# Patient Record
Sex: MALE | Race: OTHER | HISPANIC OR LATINO | URBAN - METROPOLITAN AREA
[De-identification: names, ages, dates, MRNs, and addresses within clinical notes are randomized per-mention and may not be internally consistent; named-entity substitution may affect disease eponyms.]

---

## 2017-01-03 ENCOUNTER — EMERGENCY (EMERGENCY)
Facility: HOSPITAL | Age: 82
LOS: 1 days | Discharge: HOME | End: 2017-01-03
Attending: EMERGENCY MEDICINE
Payer: MEDICARE

## 2017-01-03 VITALS
OXYGEN SATURATION: 100 % | TEMPERATURE: 99 F | DIASTOLIC BLOOD PRESSURE: 76 MMHG | WEIGHT: 177.91 LBS | RESPIRATION RATE: 18 BRPM | HEART RATE: 70 BPM | HEIGHT: 64 IN | SYSTOLIC BLOOD PRESSURE: 152 MMHG

## 2017-01-03 VITALS
HEART RATE: 70 BPM | SYSTOLIC BLOOD PRESSURE: 138 MMHG | OXYGEN SATURATION: 96 % | DIASTOLIC BLOOD PRESSURE: 67 MMHG | RESPIRATION RATE: 18 BRPM

## 2017-01-03 LAB
ALBUMIN SERPL ELPH-MCNC: 3.5 G/DL — SIGNIFICANT CHANGE UP (ref 3.3–5.2)
ALP SERPL-CCNC: 141 U/L — HIGH (ref 40–120)
ALT FLD-CCNC: 62 U/L — HIGH
ANION GAP SERPL CALC-SCNC: 11 MMOL/L — SIGNIFICANT CHANGE UP (ref 5–17)
APTT BLD: 22.4 SEC — LOW (ref 27.5–37.4)
AST SERPL-CCNC: 49 U/L — HIGH
BASOPHILS # BLD AUTO: 0 K/UL — SIGNIFICANT CHANGE UP (ref 0–0.2)
BASOPHILS NFR BLD AUTO: 0.1 % — SIGNIFICANT CHANGE UP (ref 0–2)
BILIRUB SERPL-MCNC: 0.5 MG/DL — SIGNIFICANT CHANGE UP (ref 0.4–2)
BUN SERPL-MCNC: 30 MG/DL — HIGH (ref 8–20)
CALCIUM SERPL-MCNC: 9.6 MG/DL — SIGNIFICANT CHANGE UP (ref 8.6–10.2)
CHLORIDE SERPL-SCNC: 93 MMOL/L — LOW (ref 98–107)
CK SERPL-CCNC: 53 U/L — SIGNIFICANT CHANGE UP (ref 30–200)
CO2 SERPL-SCNC: 31 MMOL/L — HIGH (ref 22–29)
CREAT SERPL-MCNC: 0.66 MG/DL — SIGNIFICANT CHANGE UP (ref 0.5–1.3)
EOSINOPHIL # BLD AUTO: 0 K/UL — SIGNIFICANT CHANGE UP (ref 0–0.5)
EOSINOPHIL NFR BLD AUTO: 0.5 % — SIGNIFICANT CHANGE UP (ref 0–5)
GLUCOSE SERPL-MCNC: 151 MG/DL — HIGH (ref 70–115)
HCT VFR BLD CALC: 37.6 % — LOW (ref 42–52)
HGB BLD-MCNC: 12 G/DL — LOW (ref 14–18)
INR BLD: 0.99 RATIO — SIGNIFICANT CHANGE UP (ref 0.88–1.16)
LYMPHOCYTES # BLD AUTO: 1.6 K/UL — SIGNIFICANT CHANGE UP (ref 1–4.8)
LYMPHOCYTES # BLD AUTO: 14.5 % — LOW (ref 20–55)
MCHC RBC-ENTMCNC: 31.9 G/DL — LOW (ref 32–36)
MCHC RBC-ENTMCNC: 32.8 PG — HIGH (ref 27–31)
MCV RBC AUTO: 102.7 FL — HIGH (ref 80–94)
MONOCYTES # BLD AUTO: 0.6 K/UL — SIGNIFICANT CHANGE UP (ref 0–0.8)
MONOCYTES NFR BLD AUTO: 5.8 % — SIGNIFICANT CHANGE UP (ref 3–10)
NEUTROPHILS # BLD AUTO: 8.6 K/UL — HIGH (ref 1.8–8)
NEUTROPHILS NFR BLD AUTO: 78.4 % — HIGH (ref 37–73)
NT-PROBNP SERPL-SCNC: 205 PG/ML — SIGNIFICANT CHANGE UP (ref 0–300)
PLATELET # BLD AUTO: 172 K/UL — SIGNIFICANT CHANGE UP (ref 150–400)
POTASSIUM SERPL-MCNC: 5.2 MMOL/L — SIGNIFICANT CHANGE UP (ref 3.5–5.3)
POTASSIUM SERPL-SCNC: 5.2 MMOL/L — SIGNIFICANT CHANGE UP (ref 3.5–5.3)
PROT SERPL-MCNC: 6.2 G/DL — LOW (ref 6.6–8.7)
PROTHROM AB SERPL-ACNC: 10.9 SEC — SIGNIFICANT CHANGE UP (ref 10–13.1)
RBC # BLD: 3.66 M/UL — LOW (ref 4.6–6.2)
RBC # FLD: 15.2 % — SIGNIFICANT CHANGE UP (ref 11–15.6)
SODIUM SERPL-SCNC: 135 MMOL/L — SIGNIFICANT CHANGE UP (ref 135–145)
TROPONIN T SERPL-MCNC: 0.02 NG/ML — SIGNIFICANT CHANGE UP (ref 0–0.06)
TROPONIN T SERPL-MCNC: 0.03 NG/ML — SIGNIFICANT CHANGE UP (ref 0–0.06)
WBC # BLD: 11 K/UL — HIGH (ref 4.8–10.8)
WBC # FLD AUTO: 11 K/UL — HIGH (ref 4.8–10.8)

## 2017-01-03 PROCEDURE — 85610 PROTHROMBIN TIME: CPT

## 2017-01-03 PROCEDURE — T1013: CPT

## 2017-01-03 PROCEDURE — 85730 THROMBOPLASTIN TIME PARTIAL: CPT

## 2017-01-03 PROCEDURE — 84484 ASSAY OF TROPONIN QUANT: CPT

## 2017-01-03 PROCEDURE — 80053 COMPREHEN METABOLIC PANEL: CPT

## 2017-01-03 PROCEDURE — 99284 EMERGENCY DEPT VISIT MOD MDM: CPT

## 2017-01-03 PROCEDURE — 85027 COMPLETE CBC AUTOMATED: CPT

## 2017-01-03 PROCEDURE — 82550 ASSAY OF CK (CPK): CPT

## 2017-01-03 PROCEDURE — 99283 EMERGENCY DEPT VISIT LOW MDM: CPT

## 2017-01-03 PROCEDURE — 83880 ASSAY OF NATRIURETIC PEPTIDE: CPT

## 2017-01-03 NOTE — ED PROVIDER NOTE - CHPI ED SYMPTOMS NEG
no left arm pain/no shortness of breath/no weakness/no dizziness/no syncope/no diaphoresis/no jaw pain/no back pain/no decreased eating/drinking/no fever/no vomiting/no cough/no palpitations/no nausea/no chills/no loss of consciousness

## 2017-01-03 NOTE — ED ADULT NURSE NOTE - OBJECTIVE STATEMENT
received pt AOx3, c/o left sided chest pain that started today. pt has stg 4 lung ca and is O2 dependent at home (4-5L). pt currently 99% on 4L. resting comfortably, in no distress. Respirations even unlabored, MAEx4. Neuro intact. non radiating pain

## 2017-01-03 NOTE — ED ADULT TRIAGE NOTE - CHIEF COMPLAINT QUOTE
pt bib ems c/o chest pain and SOB x2 days. pt on hospice for Lung CA  1 nitro and Aspirin 162mg given by EMS  normally on O2 4L-5L NC at home

## 2017-01-03 NOTE — ED ADULT NURSE NOTE - PMH
BPH (benign prostatic hyperplasia)    Depression    HTN (hypertension)    Hypercholesteremia    Osteoarthritis    Pulmonary fibrosis

## 2017-01-03 NOTE — ED PROVIDER NOTE - MEDICAL DECISION MAKING DETAILS
C/O CHEST PAIN. AWAITING EKG/LABS. COMFORTABLE IN ER C/O CHEST PAIN. AWAITING EKG/LABS. COMFORTABLE IN ER  WORK UP NEGATIVE FOR ACUTE CARDIAC PATHOLOGY, D/C

## 2017-01-03 NOTE — ED ADULT NURSE REASSESSMENT NOTE - NS ED NURSE REASSESS COMMENT FT1
pt resting comfortably, states he is feeling better. Respirations even unlabored. pending 2nd trop. Pt aware of plan of care.

## 2017-02-22 ENCOUNTER — APPOINTMENT (OUTPATIENT)
Dept: PULMONOLOGY | Facility: CLINIC | Age: 82
End: 2017-02-22

## 2017-04-06 ENCOUNTER — RX RENEWAL (OUTPATIENT)
Age: 82
End: 2017-04-06

## 2017-12-12 ENCOUNTER — INPATIENT (INPATIENT)
Facility: HOSPITAL | Age: 82
LOS: 4 days | Discharge: HOSPICE | DRG: 196 | End: 2017-12-17
Attending: HOSPITALIST | Admitting: STUDENT IN AN ORGANIZED HEALTH CARE EDUCATION/TRAINING PROGRAM
Payer: MEDICARE

## 2017-12-12 VITALS
OXYGEN SATURATION: 88 % | WEIGHT: 190.04 LBS | HEIGHT: 66 IN | RESPIRATION RATE: 32 BRPM | HEART RATE: 102 BPM | SYSTOLIC BLOOD PRESSURE: 99 MMHG | DIASTOLIC BLOOD PRESSURE: 49 MMHG | TEMPERATURE: 98 F

## 2017-12-12 DIAGNOSIS — J84.10 PULMONARY FIBROSIS, UNSPECIFIED: ICD-10-CM

## 2017-12-12 DIAGNOSIS — F32.9 MAJOR DEPRESSIVE DISORDER, SINGLE EPISODE, UNSPECIFIED: ICD-10-CM

## 2017-12-12 DIAGNOSIS — N40.0 BENIGN PROSTATIC HYPERPLASIA WITHOUT LOWER URINARY TRACT SYMPTOMS: ICD-10-CM

## 2017-12-12 DIAGNOSIS — Z29.9 ENCOUNTER FOR PROPHYLACTIC MEASURES, UNSPECIFIED: ICD-10-CM

## 2017-12-12 DIAGNOSIS — R53.81 OTHER MALAISE: ICD-10-CM

## 2017-12-12 DIAGNOSIS — Z51.5 ENCOUNTER FOR PALLIATIVE CARE: ICD-10-CM

## 2017-12-12 DIAGNOSIS — E78.00 PURE HYPERCHOLESTEROLEMIA, UNSPECIFIED: ICD-10-CM

## 2017-12-12 DIAGNOSIS — J96.21 ACUTE AND CHRONIC RESPIRATORY FAILURE WITH HYPOXIA: ICD-10-CM

## 2017-12-12 DIAGNOSIS — R09.02 HYPOXEMIA: ICD-10-CM

## 2017-12-12 DIAGNOSIS — I10 ESSENTIAL (PRIMARY) HYPERTENSION: ICD-10-CM

## 2017-12-12 LAB
ALBUMIN SERPL ELPH-MCNC: 3.5 G/DL — SIGNIFICANT CHANGE UP (ref 3.3–5.2)
ALP SERPL-CCNC: 103 U/L — SIGNIFICANT CHANGE UP (ref 40–120)
ALT FLD-CCNC: 33 U/L — SIGNIFICANT CHANGE UP
ANION GAP SERPL CALC-SCNC: 18 MMOL/L — HIGH (ref 5–17)
AST SERPL-CCNC: 73 U/L — HIGH
BASOPHILS # BLD AUTO: 0 K/UL — SIGNIFICANT CHANGE UP (ref 0–0.2)
BASOPHILS NFR BLD AUTO: 0.2 % — SIGNIFICANT CHANGE UP (ref 0–2)
BILIRUB SERPL-MCNC: 0.4 MG/DL — SIGNIFICANT CHANGE UP (ref 0.4–2)
BUN SERPL-MCNC: 21 MG/DL — HIGH (ref 8–20)
CALCIUM SERPL-MCNC: 9.2 MG/DL — SIGNIFICANT CHANGE UP (ref 8.6–10.2)
CHLORIDE SERPL-SCNC: 96 MMOL/L — LOW (ref 98–107)
CK SERPL-CCNC: 33 U/L — SIGNIFICANT CHANGE UP (ref 30–200)
CO2 SERPL-SCNC: 23 MMOL/L — SIGNIFICANT CHANGE UP (ref 22–29)
CREAT SERPL-MCNC: 1.15 MG/DL — SIGNIFICANT CHANGE UP (ref 0.5–1.3)
EOSINOPHIL # BLD AUTO: 0.4 K/UL — SIGNIFICANT CHANGE UP (ref 0–0.5)
EOSINOPHIL NFR BLD AUTO: 3.2 % — SIGNIFICANT CHANGE UP (ref 0–5)
ERYTHROCYTE [SEDIMENTATION RATE] IN BLOOD: 49 MM/HR — HIGH (ref 0–20)
GAS PNL BLDA: SIGNIFICANT CHANGE UP
GLUCOSE SERPL-MCNC: 220 MG/DL — HIGH (ref 70–115)
HCT VFR BLD CALC: 43.7 % — SIGNIFICANT CHANGE UP (ref 42–52)
HGB BLD-MCNC: 13.8 G/DL — LOW (ref 14–18)
LACTATE SERPL-SCNC: 0.8 MMOL/L — SIGNIFICANT CHANGE UP (ref 0.5–2)
LDH SERPL L TO P-CCNC: 209 U/L — HIGH (ref 98–192)
LIDOCAIN IGE QN: 38 U/L — SIGNIFICANT CHANGE UP (ref 22–51)
LYMPHOCYTES # BLD AUTO: 2.8 K/UL — SIGNIFICANT CHANGE UP (ref 1–4.8)
LYMPHOCYTES # BLD AUTO: 21.4 % — SIGNIFICANT CHANGE UP (ref 20–55)
MCHC RBC-ENTMCNC: 30.2 PG — SIGNIFICANT CHANGE UP (ref 27–31)
MCHC RBC-ENTMCNC: 31.6 G/DL — LOW (ref 32–36)
MCV RBC AUTO: 95.6 FL — HIGH (ref 80–94)
MONOCYTES # BLD AUTO: 0.6 K/UL — SIGNIFICANT CHANGE UP (ref 0–0.8)
MONOCYTES NFR BLD AUTO: 4.6 % — SIGNIFICANT CHANGE UP (ref 3–10)
NEUTROPHILS # BLD AUTO: 8.8 K/UL — HIGH (ref 1.8–8)
NEUTROPHILS NFR BLD AUTO: 67.8 % — SIGNIFICANT CHANGE UP (ref 37–73)
NT-PROBNP SERPL-SCNC: 107 PG/ML — SIGNIFICANT CHANGE UP (ref 0–300)
PLATELET # BLD AUTO: 216 K/UL — SIGNIFICANT CHANGE UP (ref 150–400)
POTASSIUM SERPL-MCNC: 4.4 MMOL/L — SIGNIFICANT CHANGE UP (ref 3.5–5.3)
POTASSIUM SERPL-SCNC: 4.4 MMOL/L — SIGNIFICANT CHANGE UP (ref 3.5–5.3)
PROT SERPL-MCNC: 7.8 G/DL — SIGNIFICANT CHANGE UP (ref 6.6–8.7)
RBC # BLD: 4.57 M/UL — LOW (ref 4.6–6.2)
RBC # FLD: 15.4 % — SIGNIFICANT CHANGE UP (ref 11–15.6)
SODIUM SERPL-SCNC: 137 MMOL/L — SIGNIFICANT CHANGE UP (ref 135–145)
TROPONIN T SERPL-MCNC: 0.06 NG/ML — SIGNIFICANT CHANGE UP (ref 0–0.06)
WBC # BLD: 13 K/UL — HIGH (ref 4.8–10.8)
WBC # FLD AUTO: 13 K/UL — HIGH (ref 4.8–10.8)

## 2017-12-12 PROCEDURE — 99223 1ST HOSP IP/OBS HIGH 75: CPT

## 2017-12-12 PROCEDURE — 71010: CPT | Mod: 26

## 2017-12-12 PROCEDURE — 99497 ADVNCD CARE PLAN 30 MIN: CPT | Mod: 25

## 2017-12-12 PROCEDURE — 99291 CRITICAL CARE FIRST HOUR: CPT

## 2017-12-12 PROCEDURE — 99053 MED SERV 10PM-8AM 24 HR FAC: CPT

## 2017-12-12 PROCEDURE — 71275 CT ANGIOGRAPHY CHEST: CPT | Mod: 26

## 2017-12-12 PROCEDURE — 93010 ELECTROCARDIOGRAM REPORT: CPT

## 2017-12-12 RX ORDER — SERTRALINE 25 MG/1
50 TABLET, FILM COATED ORAL DAILY
Qty: 0 | Refills: 0 | Status: DISCONTINUED | OUTPATIENT
Start: 2017-12-12 | End: 2017-12-12

## 2017-12-12 RX ORDER — MORPHINE SULFATE 50 MG/1
2 CAPSULE, EXTENDED RELEASE ORAL EVERY 6 HOURS
Qty: 0 | Refills: 0 | Status: DISCONTINUED | OUTPATIENT
Start: 2017-12-12 | End: 2017-12-15

## 2017-12-12 RX ORDER — PANTOPRAZOLE SODIUM 20 MG/1
40 TABLET, DELAYED RELEASE ORAL
Qty: 0 | Refills: 0 | Status: DISCONTINUED | OUTPATIENT
Start: 2017-12-12 | End: 2017-12-17

## 2017-12-12 RX ORDER — TAMSULOSIN HYDROCHLORIDE 0.4 MG/1
0.4 CAPSULE ORAL AT BEDTIME
Qty: 0 | Refills: 0 | Status: DISCONTINUED | OUTPATIENT
Start: 2017-12-12 | End: 2017-12-12

## 2017-12-12 RX ORDER — SIMVASTATIN 20 MG/1
40 TABLET, FILM COATED ORAL AT BEDTIME
Qty: 0 | Refills: 0 | Status: DISCONTINUED | OUTPATIENT
Start: 2017-12-12 | End: 2017-12-17

## 2017-12-12 RX ORDER — SENNA PLUS 8.6 MG/1
2 TABLET ORAL AT BEDTIME
Qty: 0 | Refills: 0 | Status: DISCONTINUED | OUTPATIENT
Start: 2017-12-12 | End: 2017-12-17

## 2017-12-12 RX ORDER — IPRATROPIUM/ALBUTEROL SULFATE 18-103MCG
3 AEROSOL WITH ADAPTER (GRAM) INHALATION EVERY 6 HOURS
Qty: 0 | Refills: 0 | Status: DISCONTINUED | OUTPATIENT
Start: 2017-12-12 | End: 2017-12-12

## 2017-12-12 RX ORDER — SODIUM CHLORIDE 9 MG/ML
3 INJECTION INTRAMUSCULAR; INTRAVENOUS; SUBCUTANEOUS ONCE
Qty: 0 | Refills: 0 | Status: COMPLETED | OUTPATIENT
Start: 2017-12-12 | End: 2017-12-12

## 2017-12-12 RX ORDER — SIMVASTATIN 20 MG/1
40 TABLET, FILM COATED ORAL AT BEDTIME
Qty: 0 | Refills: 0 | Status: DISCONTINUED | OUTPATIENT
Start: 2017-12-12 | End: 2017-12-12

## 2017-12-12 RX ORDER — IPRATROPIUM/ALBUTEROL SULFATE 18-103MCG
3 AEROSOL WITH ADAPTER (GRAM) INHALATION EVERY 6 HOURS
Qty: 0 | Refills: 0 | Status: COMPLETED | OUTPATIENT
Start: 2017-12-12 | End: 2017-12-17

## 2017-12-12 RX ORDER — MORPHINE SULFATE 50 MG/1
4 CAPSULE, EXTENDED RELEASE ORAL
Qty: 0 | Refills: 0 | Status: DISCONTINUED | OUTPATIENT
Start: 2017-12-12 | End: 2017-12-17

## 2017-12-12 RX ORDER — SERTRALINE 25 MG/1
50 TABLET, FILM COATED ORAL DAILY
Qty: 0 | Refills: 0 | Status: DISCONTINUED | OUTPATIENT
Start: 2017-12-12 | End: 2017-12-17

## 2017-12-12 RX ORDER — ASPIRIN/CALCIUM CARB/MAGNESIUM 324 MG
81 TABLET ORAL DAILY
Qty: 0 | Refills: 0 | Status: DISCONTINUED | OUTPATIENT
Start: 2017-12-12 | End: 2017-12-12

## 2017-12-12 RX ORDER — SODIUM CHLORIDE 9 MG/ML
250 INJECTION INTRAMUSCULAR; INTRAVENOUS; SUBCUTANEOUS ONCE
Qty: 0 | Refills: 0 | Status: COMPLETED | OUTPATIENT
Start: 2017-12-12 | End: 2017-12-12

## 2017-12-12 RX ORDER — MORPHINE SULFATE 50 MG/1
15 CAPSULE, EXTENDED RELEASE ORAL EVERY 8 HOURS
Qty: 0 | Refills: 0 | Status: DISCONTINUED | OUTPATIENT
Start: 2017-12-12 | End: 2017-12-17

## 2017-12-12 RX ORDER — TAMSULOSIN HYDROCHLORIDE 0.4 MG/1
0.4 CAPSULE ORAL AT BEDTIME
Qty: 0 | Refills: 0 | Status: DISCONTINUED | OUTPATIENT
Start: 2017-12-12 | End: 2017-12-17

## 2017-12-12 RX ORDER — PANTOPRAZOLE SODIUM 20 MG/1
40 TABLET, DELAYED RELEASE ORAL
Qty: 0 | Refills: 0 | Status: DISCONTINUED | OUTPATIENT
Start: 2017-12-12 | End: 2017-12-12

## 2017-12-12 RX ORDER — ASPIRIN/CALCIUM CARB/MAGNESIUM 324 MG
81 TABLET ORAL DAILY
Qty: 0 | Refills: 0 | Status: DISCONTINUED | OUTPATIENT
Start: 2017-12-12 | End: 2017-12-17

## 2017-12-12 RX ORDER — SENNA PLUS 8.6 MG/1
2 TABLET ORAL AT BEDTIME
Qty: 0 | Refills: 0 | Status: DISCONTINUED | OUTPATIENT
Start: 2017-12-12 | End: 2017-12-12

## 2017-12-12 RX ADMIN — SODIUM CHLORIDE 3 MILLILITER(S): 9 INJECTION INTRAMUSCULAR; INTRAVENOUS; SUBCUTANEOUS at 03:03

## 2017-12-12 RX ADMIN — Medication 3 MILLILITER(S): at 14:19

## 2017-12-12 RX ADMIN — SODIUM CHLORIDE 500 MILLILITER(S): 9 INJECTION INTRAMUSCULAR; INTRAVENOUS; SUBCUTANEOUS at 03:03

## 2017-12-12 NOTE — H&P ADULT - NEUROLOGICAL DETAILS
responds to verbal commands/alert and oriented x 3/responds to pain
responds to pain/alert and oriented x 3/responds to verbal commands

## 2017-12-12 NOTE — ED ADULT NURSE REASSESSMENT NOTE - NS ED NURSE REASSESS COMMENT FT1
Pt. started on BiPAP, unable to maintain saturation level above 90% on Venti mask, BP dropped to 80s systolic, 250 bolus initiated. pt responding well to fluids

## 2017-12-12 NOTE — CONSULT NOTE ADULT - SUBJECTIVE AND OBJECTIVE BOX
Patient is a 86y old  Male who presents with a chief complaint of SOB  PAST MEDICAL & SURGICAL HISTORY:  Depression  BPH (benign prostatic hyperplasia)  Osteoarthritis  Pulmonary fibrosis  HTN (hypertension)  Hypercholesteremia  No significant past surgical history    CHRISTINA GAL   86y    Male    Review of Systems:  + SOB   + CP              All other ROS are negative.    ICU Vital Signs Last 24 Hrs  T(C): 36.9 (12 Dec 2017 02:25), Max: 36.9 (12 Dec 2017 02:25)  T(F): 98.4 (12 Dec 2017 02:25), Max: 98.4 (12 Dec 2017 02:25)  HR: 84 (12 Dec 2017 03:40) (80 - 102)  BP: 104/56 (12 Dec 2017 03:40) (99/49 - 104/56)  BP(mean): --  ABP: --  ABP(mean): --  RR: 21 (12 Dec 2017 03:40) (21 - 32)  SpO2: 99% (12 Dec 2017 03:40) (88% - 99%)    Physical Examination:    General:  No distress on BIPAP.      HEENT: + JVD    PULM: bilateral BS crackles bilat     CVS: s1 s2 reg    ABD:  soft NT    EXT: + edema     SKIN:  warm    Neuro: alert awake  moves 4 ext    ABG - ( 12 Dec 2017 03:00 )  pH: 7.34  /  pCO2: 43    /  pO2: 71    / HCO3: 22    / Base Excess: -2.8  /  SaO2: 93          LABS:                        13.8   13.0  )-----------( 216      ( 12 Dec 2017 02:46 )             43.7     12-12    137  |  96<L>  |  21.0<H>  ----------------------------<  220<H>  4.4   |  23.0  |  1.15    Ca    9.2      12 Dec 2017 02:46    TPro  7.8  /  Alb  3.5  /  TBili  0.4  /  DBili  x   /  AST  73<H>  /  ALT  33  /  AlkPhos  103  12-12      CARDIAC MARKERS ( 12 Dec 2017 02:46 )  x     / 0.06 ng/mL / 33 U/L / x     / x          Medications    RADIOLOGY/IMAGING/ECHO  < from: CT Angio Chest w/ IV Cont (12.12.17 @ 05:02) >  IMPRESSION:    Negative for pulmonary emboli.    Chronic interstitial lung disease, likely IPF. Overall no significant   interval change from prior exam.      < end of copied text >      Assessment/Plan:    85M hx HTN HLD depression BPH ex smoker with  crippling pulmonary fibrosis on 24/7 face mask 02.  Admit with SOB.  Hypoxemic in the ED  placed on BIPAP and sent for CTA to r/o pE which is negative.     Information provided to me by the grandson.  The patient lives with his daughter who is now visiting other family out of town.      Christina has been essentially confined to a chair for the better part of the last 3 months with a decline in his quality of life.  He becomes extremely SOB with tasks as simple as trying to stand up.  He can no longer walk to the bathroom without severe resp distress.        I spoke to grandson in terms of ventilator and likelihood of no benefit, and likely  increased burden     Palliative care consult placed.  The Grandson Thor Perez [phone] would like to be available to help his Mom (pt's daughter) with difficult decisions.      Patient is comfortable  on BIPAP  His ABG is acceptable  He is HD stable.    Lactate elevation may be due to resp distress,  and a result of his hypoxemia, repeat.      ?? if there is any acute component here that has worsened his underlying disease of is this just progression.      Would culture add zithromax nothing more at this point.  Send RVP.      Ask [facility] to see as per hospital policy.  Perhaps they can help with prognosis to assist family/patient with decisions.

## 2017-12-12 NOTE — ED ADULT NURSE REASSESSMENT NOTE - NS ED NURSE REASSESS COMMENT FT1
patient received alert and oriented x3, on Bi-PAP for difficulty breathing, patient alert and oriented x3, denies any discomfort at this time. on cardiac monitor. NSR, VSS, awaiting bed in the Stepdown unit. will continue to monitor.

## 2017-12-12 NOTE — H&P ADULT - ASSESSMENT
87 yo M with h/o idiopathic pulmonary fibrosis here with IPF exacerbation and worsening decline over the past year.
87 yo M with h/o idiopathic pulmonary fibrosis here with IPF exacerbation and worsening decline over the past year.

## 2017-12-12 NOTE — ED ADULT NURSE NOTE - OBJECTIVE STATEMENT
Pt. BIBA for respiratory distress. Pt. has Hx of Lung CA end stage, as per family pt. is not receiving any treatments, has home health nurse once a week, diagnosed in July.

## 2017-12-12 NOTE — ED PROVIDER NOTE - PROGRESS NOTE DETAILS
60 % ventilation not working for pt, will place pt on BiPAP, order diuretics will re-evaluate. On bipap, patient is stating 96%. ABG is as noted. CXR is noted and appears similar to previous cxr. Will obtain ICU consultation and CT to r/o acute PE for PNA in setting of lung CA with mets. Patient stable. ICU consult obtained. Patient still requires bipap. Will admit to step down for management.

## 2017-12-12 NOTE — ED ADULT NURSE REASSESSMENT NOTE - NS ED NURSE REASSESS COMMENT FT1
pt care assumed at 1950, no apparent distress noted at this time, charting as noted. Pt received Alert and Oriented to person, place, situation and time laying in bed comfortably at this time. Pt c/o hunger, but denies difficulty breathing. Pt is maintaining 96% on high flow NC. pt was placed on bedpan and cleaned at this time. Pt remains Normal Sinus Rhythm on cardiac monitor. VSS. plan fo care explained, will continue to monitor. pt care assumed at 1950, no apparent distress noted at this time, charting as noted. Pt received Alert and Oriented to person, place, situation and time laying in bed comfortably at this time. Pt c/o hunger, but denies difficulty breathing at this time. Pt is maintaining 96% on high flow NC. pt was placed on bedpan and cleaned at this time. Pt remains Normal Sinus Rhythm on cardiac monitor. VSS. Emergency Department resp therapist called to change bag of fluid for humidifier. plan fo care explained, will continue to monitor.

## 2017-12-12 NOTE — H&P ADULT - GASTROINTESTINAL DETAILS
soft/no masses palpable/nontender/normal/bowel sounds normal/no distention
normal/soft/nontender/no distention/no masses palpable

## 2017-12-12 NOTE — CONSULT NOTE ADULT - ASSESSMENT
Patient with ILD.  Personal review of CT not completely consistent with IPF>minimal honeycombing.  Primary finding is that of diffuse GGO with interlobular septal thickening.  Review of prior data with increased ESPERANZA>?vasculitis.  ?NSIP.  Additional issue is that of history of PCP.  Unfortunately patient is NOT a candidate for aggressive interventions i.e. OLB or bronchoscopy related to age and current condition.     Plan:  1.Agree with steroids  2.High flow O2>titrate as able  3.Palliative care issues  4.Empiric Bactrim  5.Anca, esperanza, ESR, LDH requested.6  6.DNR appropriate.   40 minutes critical care >patient to go to step down unit.
86M with advanced interstitial lung disease ON HOME HOSPICE, here with acute on chronic respiratory failure now on high flow due to progressive lung disease.

## 2017-12-12 NOTE — ED PROVIDER NOTE - CARE PLAN
Principal Discharge DX:	Hypoxia Principal Discharge DX:	Hypoxia  Secondary Diagnosis:	Pulmonary fibrosis

## 2017-12-12 NOTE — CONSULT NOTE ADULT - PROBLEM SELECTOR RECOMMENDATION 4
-met with patient and his grandson Julien at bedside. Explained to him his prognosis and advancing lung disease. He and his grandson understand. I asked him about his advanced directives, his wife was prolonged on machines, etc and he would never want to go thrugh things like that. He wants a natural death, DNR, DNI, MOLST filled out.

## 2017-12-12 NOTE — ED PROVIDER NOTE - MUSCULOSKELETAL, MLM
Spine appears normal, range of motion is not limited, no muscle or joint tenderness. No pedal edema. No calf TTP.

## 2017-12-12 NOTE — H&P ADULT - HISTORY OF PRESENT ILLNESS
85 yo M with h/o idiopathic pulmonary fibrosis present from home for worsening shortness of breath. Pt states he noticed difficulty breathing upon ambulation to the bathroom last night. He also notes that he was more drowsy than usual. In the ED, he was found to be in severe respiratory distress. Placed on BiPAP. MICU eval notes poor prognosis as pt has been deteriorating significantly over the year. CXR showed diffuse b/l reticular nodules. CT chest showed negative for pulmonary emboli, severe chronic interstitial lung disease, likely IPF.

## 2017-12-12 NOTE — ED PROVIDER NOTE - CHPI ED SYMPTOMS NEG
Pau Chavarria comes to the clinic for follow up anticoagulation management visit with .    Patient denies any complaints related to anticoagulation therapy at this time. Patient reports no change in medication, diet or health. Reinforced with patient to call clinic with any medication changes as this can impact INR. Reinforced signs and symptoms bleeding/clotting with patient. Patient aware to seek medical care if signs and symptoms develop. Advised that if patient falls and/or hits their head, they should seek medical attention. Verbalized understanding.     Dosing instructions given to patient both verbally and in writing. Advised to call the clinic with any questions or concerns. Patient verbalized understanding. Clinic number provided.    Anticoagulation Summary  As of 10/17/2017    INR goal:   2.0-3.0   TTR:   95.7 % (1.1 y)   Today's INR:   2.7   Maintenance plan:   2.5 mg (2.5 mg x 1) on Moore, Tu, F; 5 mg (2.5 mg x 2) all other days   Weekly total:   27.5 mg   Plan last modified:   Katiana Dial RN (3/8/2017)   Next INR check:   11/15/2017   Target end date:   Indefinite    Indications    Chronic atrial fibrillation (CMS/HCC) [I48.2]             Anticoagulation Episode Summary     INR check location:   [facility]    Preferred lab:       Send INR reminders to:   [facility] (OPEN ENROLLMENT) [facility] CARD/EP    Comments:   Next STAC due 9/19/18; 2.5mg tablets; AAC; Nutritional assessment done 9/14/16      Anticoagulation Care Providers     Provider Role Specialty Phone number    Edilberto Agrawal MD Referring Internal Medicine- Interventional Cardiology [phone]          
no fever/no headache/no chills

## 2017-12-12 NOTE — H&P ADULT - PMH
BPH (benign prostatic hyperplasia)    Depression    HTN (hypertension)    Hypercholesteremia    Osteoarthritis    Pulmonary fibrosis
BPH (benign prostatic hyperplasia)    Depression    HTN (hypertension)    Hypercholesteremia    Osteoarthritis    Pulmonary fibrosis

## 2017-12-12 NOTE — H&P ADULT - HISTORY OF PRESENT ILLNESS
85 yo M with h/o 85 yo M with h/o idiopathic pulmnary fibrosis present from home for worsening shortness of breath. Pt states that he was in his usual state of health when he noticed 85 yo M with h/o idiopathic pulmonary fibrosis present from home for worsening shortness of breath. Pt states he noticed difficulty breathing upon ambulation to the bathroom last night. He also notes that he was more drowsy than usual. In the ED, he was found to be in severe respiratory distress. Placed on BiPAP. MICU eval notes poor prognosis as pt has been deteriorating significantly over the year. CXR showed diffuse b/l reticular nodules. CT chest showed negative for pulmonary emboli, severe chronic interstitial lung disease, likely IPF.

## 2017-12-12 NOTE — CONSULT NOTE ADULT - PROBLEM SELECTOR RECOMMENDATION 2
- very poor overall prognosis, patient is on morphine at home, will order ivp ATC here to help him get off high flow. order home dose of oxycontin 15mg TID.

## 2017-12-12 NOTE — ED PROVIDER NOTE - OBJECTIVE STATEMENT
This is an 85 y/o M PMHx stage 4 lung CA presents to ED c/o difficulty breathing. Associated Sx of CP, being cold and lethargic. Pt states he was fine this afternoon but this evening felt as if he was drowning. Denies nausea, vomiting, fever, chills, abd pain, HA or any other complaints at this time. This is an 87 y/o M PMHx stage 4 lung CA on chronic supplement oxygenation presents to ED c/o difficulty breathing. Associated Sx of CP, being cold and lethargic. Pt states he was fine this afternoon but this evening felt as if he was drowning. Denies nausea, vomiting, fever, chills, abd pain, HA or any other complaints at this time.

## 2017-12-12 NOTE — ED ADULT TRIAGE NOTE - CHIEF COMPLAINT QUOTE
Pt with difficulty breathing worsening tonight, pt lethargic upon EMS arrival, pt placed on NRB prior to arrival, MD Puentes called to bedside

## 2017-12-12 NOTE — H&P ADULT - PROBLEM SELECTOR PLAN 2
Start steroids  Duonebs  Pulm consult  Palliatie consult pending  Possible hospice
Start steroids  Duonebs  Pulm consult  Palliatie consult pending  Possible hospice

## 2017-12-12 NOTE — CONSULT NOTE ADULT - SUBJECTIVE AND OBJECTIVE BOX
HPI:    PERTINENT PMH REVIEWED: Yes No    PAST MEDICAL & SURGICAL HISTORY:  Depression  BPH (benign prostatic hyperplasia)  Osteoarthritis  Pulmonary fibrosis  HTN (hypertension)  Hypercholesteremia  No significant past surgical history      SOCIAL HISTORY:                                     Admitted from:  home  SNF  [facility]     Surrogate/HCP/Guardian: Phone#:    FAMILY HISTORY:  No pertinent family history in first degree relatives      Baseline ADLs (prior to admission):  Independent/ Dependent      Allergies    No Known Allergies    Intolerances        Present Symptoms:     Dyspnea: 0 1 2 3   Nausea/Vomiting: Yes No  Anxiety:  Yes No  Depression: Yes No  Fatigue: Yes No  Loss of appetite: Yes No    Pain:             Character-            Duration-            Effect-            Factors-            Frequency-            Location-            Severity-    Review of Systems: Reviewed                     Negative:                     Positive:  Unable to obtain due to poor mentation   All others negative    MEDICATIONS  (STANDING):  ALBUTerol/ipratropium for Nebulization 3 milliLiter(s) Nebulizer every 6 hours  aspirin enteric coated 81 milliGRAM(s) Oral daily  methylPREDNISolone sodium succinate Injectable 40 milliGRAM(s) IV Push every 8 hours  pantoprazole    Tablet 40 milliGRAM(s) Oral before breakfast  senna 2 Tablet(s) Oral at bedtime  sertraline 50 milliGRAM(s) Oral daily  simvastatin 40 milliGRAM(s) Oral at bedtime  tamsulosin 0.4 milliGRAM(s) Oral at bedtime  trimethoprim  160 mG/sulfamethoxazole 800 mG 1 Tablet(s) Oral two times a day    MEDICATIONS  (PRN):      PHYSICAL EXAM:    Vital Signs Last 24 Hrs  T(C): 36.9 (12 Dec 2017 12:47), Max: 36.9 (12 Dec 2017 02:25)  T(F): 98.4 (12 Dec 2017 12:47), Max: 98.4 (12 Dec 2017 02:25)  HR: 79 (12 Dec 2017 12:47) (63 - 102)  BP: 115/59 (12 Dec 2017 12:47) (98/58 - 115/59)  BP(mean): 77 (12 Dec 2017 12:47) (77 - 77)  RR: 18 (12 Dec 2017 12:47) (16 - 32)  SpO2: 95% (12 Dec 2017 12:47) (88% - 99%)    General: alert  oriented x ____ lethargic agitated                  cachexia  nonverbal  coma    Karnofsky:  %    HEENT: normal  dry mouth  ET tube/trach    Lungs: comfortable tachypnea/labored breathing  excessive secretions    CV: normal  tachycardia    GI: normal  distended  tender  no BS               PEG/NG/OG tube  constipation  last BM:     : normal  incontinent  oliguria/anuria  cordoba    MSK: normal  weakness  edema             ambulatory  bedbound/wheelchair bound    Skin: normal  pressure ulcers- Stage_____  no rash    LABS:                        13.8   13.0  )-----------( 216      ( 12 Dec 2017 02:46 )             43.7     12-12    137  |  96<L>  |  21.0<H>  ----------------------------<  220<H>  4.4   |  23.0  |  1.15    Ca    9.2      12 Dec 2017 02:46    TPro  7.8  /  Alb  3.5  /  TBili  0.4  /  DBili  x   /  AST  73<H>  /  ALT  33  /  AlkPhos  103  12-12        I&O's Summary      RADIOLOGY & ADDITIONAL STUDIES:    ADVANCE DIRECTIVES:   DNR YES NO  Completed on:                     MOLST  YES NO   Completed on:  Living Will  YES NO   Completed on: HPI: 86M with PMH as listed admitted 12/12 with     PERTINENT PMH REVIEWED: Yes     PAST MEDICAL & SURGICAL HISTORY:  Depression  BPH (benign prostatic hyperplasia)  Osteoarthritis  Pulmonary fibrosis  HTN (hypertension)  Hypercholesteremia  No significant past surgical history    SOCIAL HISTORY:  nonsmoker                                   Admitted from:  home      Surrogate - daughter Candy, and grand sons Julien and Hany     FAMILY HISTORY:  No pertinent family history in first degree relatives    Baseline ADLs (prior to admission):  Dependent      Allergies    No Known Allergies    Present Symptoms:     Dyspnea: 2  Nausea/Vomiting: No  Anxiety:  No  Depression: No  Fatigue: Yes   Loss of appetite: No    Pain: none             Character-            Duration-            Effect-            Factors-            Frequency-            Location-            Severity-    Review of Systems: Reviewed                Positive - shortness of breath   All Others negative    MEDICATIONS  (STANDING):  ALBUTerol/ipratropium for Nebulization 3 milliLiter(s) Nebulizer every 6 hours  aspirin enteric coated 81 milliGRAM(s) Oral daily  methylPREDNISolone sodium succinate Injectable 40 milliGRAM(s) IV Push every 8 hours  pantoprazole    Tablet 40 milliGRAM(s) Oral before breakfast  senna 2 Tablet(s) Oral at bedtime  sertraline 50 milliGRAM(s) Oral daily  simvastatin 40 milliGRAM(s) Oral at bedtime  tamsulosin 0.4 milliGRAM(s) Oral at bedtime  trimethoprim  160 mG/sulfamethoxazole 800 mG 1 Tablet(s) Oral two times a day    PHYSICAL EXAM:    Vital Signs Last 24 Hrs  T(C): 36.9 (12 Dec 2017 12:47), Max: 36.9 (12 Dec 2017 02:25)  T(F): 98.4 (12 Dec 2017 12:47), Max: 98.4 (12 Dec 2017 02:25)  HR: 79 (12 Dec 2017 12:47) (63 - 102)  BP: 115/59 (12 Dec 2017 12:47) (98/58 - 115/59)  BP(mean): 77 (12 Dec 2017 12:47) (77 - 77)  RR: 18 (12 Dec 2017 12:47) (16 - 32)  SpO2: 95% (12 Dec 2017 12:47) (88% - 99%)    General: alert      Karnofsky:  30 %    HEENT: dry mouth      Lungs: tachypnea/labored breathing      CV: normal      GI: normal      : normal     MSK: weakness; chair bound     Skin: no rash    LABS:                      13.8   13.0  )-----------( 216      ( 12 Dec 2017 02:46 )             43.7     12-12    137  |  96<L>  |  21.0<H>  ----------------------------<  220<H>  4.4   |  23.0  |  1.15    Ca    9.2      12 Dec 2017 02:46    TPro  7.8  /  Alb  3.5  /  TBili  0.4  /  DBili  x   /  AST  73<H>  /  ALT  33  /  AlkPhos  103  12-12    I&O's Summary    RADIOLOGY & ADDITIONAL STUDIES:    ADVANCE DIRECTIVES: Full Code HPI: 86M with PMH as listed admitted 12/12 with acute on chronic respiratory failure.     PERTINENT PMH REVIEWED: Yes     PAST MEDICAL & SURGICAL HISTORY:  Depression  BPH (benign prostatic hyperplasia)  Osteoarthritis  Pulmonary fibrosis  HTN (hypertension)  Hypercholesteremia  No significant past surgical history    SOCIAL HISTORY:  nonsmoker                                   Admitted from:  home      Surrogate - daughter Candy, and grand sons Julien and Hany     FAMILY HISTORY:  No pertinent family history in first degree relatives    Baseline ADLs (prior to admission):  Dependent      Allergies    No Known Allergies    Present Symptoms:     Dyspnea: 2  Nausea/Vomiting: No  Anxiety:  No  Depression: No  Fatigue: Yes   Loss of appetite: No    Pain: none             Character-            Duration-            Effect-            Factors-            Frequency-            Location-            Severity-    Review of Systems: Reviewed                Positive - shortness of breath   All Others negative    MEDICATIONS  (STANDING):  ALBUTerol/ipratropium for Nebulization 3 milliLiter(s) Nebulizer every 6 hours  aspirin enteric coated 81 milliGRAM(s) Oral daily  methylPREDNISolone sodium succinate Injectable 40 milliGRAM(s) IV Push every 8 hours  pantoprazole    Tablet 40 milliGRAM(s) Oral before breakfast  senna 2 Tablet(s) Oral at bedtime  sertraline 50 milliGRAM(s) Oral daily  simvastatin 40 milliGRAM(s) Oral at bedtime  tamsulosin 0.4 milliGRAM(s) Oral at bedtime  trimethoprim  160 mG/sulfamethoxazole 800 mG 1 Tablet(s) Oral two times a day    PHYSICAL EXAM:    Vital Signs Last 24 Hrs  T(C): 36.9 (12 Dec 2017 12:47), Max: 36.9 (12 Dec 2017 02:25)  T(F): 98.4 (12 Dec 2017 12:47), Max: 98.4 (12 Dec 2017 02:25)  HR: 79 (12 Dec 2017 12:47) (63 - 102)  BP: 115/59 (12 Dec 2017 12:47) (98/58 - 115/59)  BP(mean): 77 (12 Dec 2017 12:47) (77 - 77)  RR: 18 (12 Dec 2017 12:47) (16 - 32)  SpO2: 95% (12 Dec 2017 12:47) (88% - 99%)    General: alert      Karnofsky:  30 %    HEENT: dry mouth      Lungs: tachypnea/labored breathing      CV: normal      GI: normal      : normal     MSK: weakness; chair bound     Skin: no rash    LABS:                      13.8   13.0  )-----------( 216      ( 12 Dec 2017 02:46 )             43.7     12-12    137  |  96<L>  |  21.0<H>  ----------------------------<  220<H>  4.4   |  23.0  |  1.15    Ca    9.2      12 Dec 2017 02:46    TPro  7.8  /  Alb  3.5  /  TBili  0.4  /  DBili  x   /  AST  73<H>  /  ALT  33  /  AlkPhos  103  12-12    I&O's Summary    RADIOLOGY & ADDITIONAL STUDIES:  < from: CT Angio Chest w/ IV Cont (12.12.17 @ 05:02) >    Negative for pulmonary emboli. Chronic interstitial lung disease, likely IPF. Overall no significant interval change from prior exam.    ADVANCE DIRECTIVES: Full Code, now DNR

## 2017-12-12 NOTE — CONSULT NOTE ADULT - ATTENDING COMMENTS
Thank you for the opportunity to assist with the care of this patient.   [facility] Consult Service [phone]. COUNSELING:  Face to face meeting to discuss Advanced Care Planning - Time Spent 20 Minutes.     Thank you for the opportunity to assist with the care of this patient.   [facility] Consult Service [phone].

## 2017-12-12 NOTE — CONSULT NOTE ADULT - SUBJECTIVE AND OBJECTIVE BOX
PULMONARY CONSULT NOTE      EDD SANTO-[id number]    Patient is a 86y old  Male who presents with a chief complaint of shortness of breath.  He is known to us with interstitial lung disease which apparently was diagnosed as "IPF" in [place] in 2016.  Patient however has well preserved spirometry but is O2 dependent and therefore with marked diffusion abnormality.  No documented cardiac disease.  Has had PCP in the past.  Unclear if taking steroids at home but has been on Hospice previously and is a DNR.  Comes to ER with worsening dyspnea and placed on BIPAP>refused ICU admission.  Now on high flow O2 and comfortable.  Patient reports cough, chills and sputum.  No chest pain.      INTERVAL HPI/OVERNIGHT EVENTS:    MEDICATIONS  (STANDING):  trimethoprim  160 mG/sulfamethoxazole 800 mG 1 Tablet(s) Oral two times a day      MEDICATIONS  (PRN):      Allergies    No Known Allergies    Intolerances        PAST MEDICAL & SURGICAL HISTORY:  Depression  BPH (benign prostatic hyperplasia)  Osteoarthritis  Pulmonary fibrosis  HTN (hypertension)  Hypercholesteremia  No significant past surgical history      FAMILY HISTORY:  No pertinent family history in first degree relatives      SOCIAL HISTORY  Smoking History: Non smoker    REVIEW OF SYSTEMS:    CONSTITUTIONAL:  As per HPI.    HEENT:  Eyes:  No diplopia or blurred vision. ENT:  No earache, sore throat or runny nose.    CARDIOVASCULAR:  No pressure, squeezing, tightness, heaviness or aching about the chest; no palpitations.    RESPIRATORY:  Per HPI    GASTROINTESTINAL:  No nausea, vomiting or diarrhea.    GENITOURINARY:  No dysuria, frequency or urgency.    MUSCULOSKELETAL:  Hip pain    SKIN:  No new lesions.    NEUROLOGIC:  No paresthesias, fasciculations, seizures or weakness.    PSYCHIATRIC:  No disorder of thought or mood.    ENDOCRINE:  No heat or cold intolerance, polyuria or polydipsia.    HEMATOLOGICAL:  No easy bruising or bleeding.     Vital Signs Last 24 Hrs  T(C): 36.9 (12 Dec 2017 09:11), Max: 36.9 (12 Dec 2017 02:25)  T(F): 98.4 (12 Dec 2017 09:11), Max: 98.4 (12 Dec 2017 02:25)  HR: 71 (12 Dec 2017 10:15) (71 - 102)  BP: 112/55 (12 Dec 2017 10:15) (98/58 - 115/59)  BP(mean): 77 (12 Dec 2017 09:11) (77 - 77)  RR: 18 (12 Dec 2017 10:15) (16 - 32)  SpO2: 97% (12 Dec 2017 10:15) (88% - 99%)    PHYSICAL EXAMINATION:    GENERAL: The patient is a well-developed, well-nourished _____in no apparent distress.     HEENT: Head is normocephalic and atraumatic. Extraocular muscles are intact. Mucous membranes are moist.     NECK: Supple.     LUNGS: Clear to auscultation without wheezing, rales, or rhonchi. Respirations unlabored    HEART: Regular rate and rhythm without murmur.    ABDOMEN: Soft, nontender, and nondistended.  No hepatosplenomegaly is noted.    EXTREMITIES: Without any cyanosis, clubbing, rash, lesions or edema.    NEUROLOGIC: Grossly intact.    SKIN: No ulceration or induration present.      LABS:                        13.8   13.0  )-----------( 216      ( 12 Dec 2017 02:46 )             43.7     12-12    137  |  96<L>  |  21.0<H>  ----------------------------<  220<H>  4.4   |  23.0  |  1.15    Ca    9.2      12 Dec 2017 02:46    TPro  7.8  /  Alb  3.5  /  TBili  0.4  /  DBili  x   /  AST  73<H>  /  ALT  33  /  AlkPhos  103  12-12        ABG - ( 12 Dec 2017 03:00 )  pH: 7.34  /  pCO2: 43    /  pO2: 71    / HCO3: 22    / Base Excess: -2.8  /  SaO2: 93                CARDIAC MARKERS ( 12 Dec 2017 02:46 )  x     / 0.06 ng/mL / 33 U/L / x     / x            Serum Pro-Brain Natriuretic Peptide: 107 pg/mL (12-12-17 @ 02:46)          MICROBIOLOGY:    RADIOLOGY & ADDITIONAL STUDIES:< from: CT Angio Chest w/ IV Cont (12.12.17 @ 05:02) >  COMPARISON: Prior CT chest 9/1/2016.    FINDINGS:      Pulmonary arteries: No filling defects to suggest pulmonary emboli.  Aorta: No aneurysm. Marked atherosclerotic disease of the aorta and its   branches.    Lines and tubes: None.  Airways: Tracheobronchial tree is patent.  Lungs and Pleura: There are peripheral cystic changes seen throughout the   lungs, calcified hyperdense lesion in the posterior right upper lobe is   stable from prior exam likely related to pleural calcification. There are   mild groundglass opacity seen in the bilateral lower lobes. There is   associated traction bronchiectasis.    Mediastinum and lymph nodes: No mass or hemorrhage. No worsening   mediastinal adenopathy with multiple stable prominent mediastinal nodes.   No worrisome hilar or axillary adenopathy.  Heart: Stable cardiomegaly.. No pericardial effusion.    Upper Abdomen: No suspicious abnormalities of the visualized portions of   the liver, spleen, adrenal glands, or kidneys.    Bones and soft tissues: No suspicious lesions. Multiple age-indeterminate   but likely chronic compression deformities throughout the thoracic spine.    IMPRESSION:    Negative for pulmonary emboli.    Chronic interstitial lung disease, likely IPF. Overall no significant   interval change from prior exam.    < end of copied text >  < from: CT Angio Chest w/ IV Cont (12.12.17 @ 05:02) >  COMPARISON: Prior CT chest 9/1/2016.    FINDINGS:      Pulmonary arteries: No filling defects to suggest pulmonary emboli.  Aorta: No aneurysm. Marked atherosclerotic disease of the aorta and its   branches.    Lines and tubes: None.  Airways: Tracheobronchial tree is patent.  Lungs and Pleura: There are peripheral cystic changes seen throughout the   lungs, calcified hyperdense lesion in the posterior right upper lobe is   stable from prior exam likely related to pleural calcification. There are   mild groundglass opacity seen in the bilateral lower lobes. There is   associated traction bronchiectasis.    Mediastinum and lymph nodes: No mass or hemorrhage. No worsening   mediastinal adenopathy with multiple stable prominent mediastinal nodes.   No worrisome hilar or axillary adenopathy.  Heart: Stable cardiomegaly.. No pericardial effusion.    Upper Abdomen: No suspicious abnormalities of the visualized portions of   the liver, spleen, adrenal glands, or kidneys.    Bones and soft tissues: No suspicious lesions. Multiple age-indeterminate   but likely chronic compression deformities throughout the thoracic spine.    IMPRESSION:    Negative for pulmonary emboli.    Chronic interstitial lung disease, likely IPF. Overall no significant   interval change from prior exam.    < end of copied text >

## 2017-12-12 NOTE — H&P ADULT - CONSTITUTIONAL COMMENTS
comfortable, able to speak in short sentences off bipap
comfortable, able to speak in short sentences off bipap

## 2017-12-13 DIAGNOSIS — E78.00 PURE HYPERCHOLESTEROLEMIA, UNSPECIFIED: ICD-10-CM

## 2017-12-13 DIAGNOSIS — I10 ESSENTIAL (PRIMARY) HYPERTENSION: ICD-10-CM

## 2017-12-13 DIAGNOSIS — F32.9 MAJOR DEPRESSIVE DISORDER, SINGLE EPISODE, UNSPECIFIED: ICD-10-CM

## 2017-12-13 DIAGNOSIS — N40.0 BENIGN PROSTATIC HYPERPLASIA WITHOUT LOWER URINARY TRACT SYMPTOMS: ICD-10-CM

## 2017-12-13 LAB
ANION GAP SERPL CALC-SCNC: 14 MMOL/L — SIGNIFICANT CHANGE UP (ref 5–17)
BASOPHILS # BLD AUTO: 0 K/UL — SIGNIFICANT CHANGE UP (ref 0–0.2)
BASOPHILS NFR BLD AUTO: 0.3 % — SIGNIFICANT CHANGE UP (ref 0–2)
BUN SERPL-MCNC: 17 MG/DL — SIGNIFICANT CHANGE UP (ref 8–20)
CALCIUM SERPL-MCNC: 9.4 MG/DL — SIGNIFICANT CHANGE UP (ref 8.6–10.2)
CHLORIDE SERPL-SCNC: 99 MMOL/L — SIGNIFICANT CHANGE UP (ref 98–107)
CO2 SERPL-SCNC: 26 MMOL/L — SIGNIFICANT CHANGE UP (ref 22–29)
CREAT SERPL-MCNC: 0.87 MG/DL — SIGNIFICANT CHANGE UP (ref 0.5–1.3)
EOSINOPHIL # BLD AUTO: 0.1 K/UL — SIGNIFICANT CHANGE UP (ref 0–0.5)
EOSINOPHIL NFR BLD AUTO: 1 % — SIGNIFICANT CHANGE UP (ref 0–5)
GLUCOSE SERPL-MCNC: 92 MG/DL — SIGNIFICANT CHANGE UP (ref 70–115)
HCT VFR BLD CALC: 40.9 % — LOW (ref 42–52)
HGB BLD-MCNC: 13 G/DL — LOW (ref 14–18)
LYMPHOCYTES # BLD AUTO: 18 % — LOW (ref 20–55)
LYMPHOCYTES # BLD AUTO: 2.1 K/UL — SIGNIFICANT CHANGE UP (ref 1–4.8)
MAGNESIUM SERPL-MCNC: 2.1 MG/DL — SIGNIFICANT CHANGE UP (ref 1.6–2.6)
MCHC RBC-ENTMCNC: 29.2 PG — SIGNIFICANT CHANGE UP (ref 27–31)
MCHC RBC-ENTMCNC: 31.8 G/DL — LOW (ref 32–36)
MCV RBC AUTO: 91.9 FL — SIGNIFICANT CHANGE UP (ref 80–94)
MONOCYTES # BLD AUTO: 0.7 K/UL — SIGNIFICANT CHANGE UP (ref 0–0.8)
MONOCYTES NFR BLD AUTO: 5.7 % — SIGNIFICANT CHANGE UP (ref 3–10)
NEUTROPHILS # BLD AUTO: 8.9 K/UL — HIGH (ref 1.8–8)
NEUTROPHILS NFR BLD AUTO: 74.7 % — HIGH (ref 37–73)
PHOSPHATE SERPL-MCNC: 2.3 MG/DL — LOW (ref 2.4–4.7)
PLATELET # BLD AUTO: 232 K/UL — SIGNIFICANT CHANGE UP (ref 150–400)
POTASSIUM SERPL-MCNC: 4 MMOL/L — SIGNIFICANT CHANGE UP (ref 3.5–5.3)
POTASSIUM SERPL-SCNC: 4 MMOL/L — SIGNIFICANT CHANGE UP (ref 3.5–5.3)
RAPID RVP RESULT: SIGNIFICANT CHANGE UP
RBC # BLD: 4.45 M/UL — LOW (ref 4.6–6.2)
RBC # FLD: 15.3 % — SIGNIFICANT CHANGE UP (ref 11–15.6)
SODIUM SERPL-SCNC: 139 MMOL/L — SIGNIFICANT CHANGE UP (ref 135–145)
WBC # BLD: 11.9 K/UL — HIGH (ref 4.8–10.8)
WBC # FLD AUTO: 11.9 K/UL — HIGH (ref 4.8–10.8)

## 2017-12-13 PROCEDURE — 99233 SBSQ HOSP IP/OBS HIGH 50: CPT

## 2017-12-13 RX ORDER — POTASSIUM PHOSPHATE, MONOBASIC POTASSIUM PHOSPHATE, DIBASIC 236; 224 MG/ML; MG/ML
15 INJECTION, SOLUTION INTRAVENOUS ONCE
Qty: 0 | Refills: 0 | Status: COMPLETED | OUTPATIENT
Start: 2017-12-13 | End: 2017-12-13

## 2017-12-13 RX ORDER — IPRATROPIUM/ALBUTEROL SULFATE 18-103MCG
3 AEROSOL WITH ADAPTER (GRAM) INHALATION EVERY 6 HOURS
Qty: 0 | Refills: 0 | Status: COMPLETED | OUTPATIENT
Start: 2017-12-13 | End: 2017-12-14

## 2017-12-13 RX ORDER — INFLUENZA VIRUS VACCINE 15; 15; 15; 15 UG/.5ML; UG/.5ML; UG/.5ML; UG/.5ML
0.5 SUSPENSION INTRAMUSCULAR ONCE
Qty: 0 | Refills: 0 | Status: DISCONTINUED | OUTPATIENT
Start: 2017-12-13 | End: 2017-12-17

## 2017-12-13 RX ADMIN — POTASSIUM PHOSPHATE, MONOBASIC POTASSIUM PHOSPHATE, DIBASIC 62.5 MILLIMOLE(S): 236; 224 INJECTION, SOLUTION INTRAVENOUS at 21:38

## 2017-12-13 RX ADMIN — MORPHINE SULFATE 2 MILLIGRAM(S): 50 CAPSULE, EXTENDED RELEASE ORAL at 11:08

## 2017-12-13 RX ADMIN — SENNA PLUS 2 TABLET(S): 8.6 TABLET ORAL at 21:38

## 2017-12-13 RX ADMIN — MORPHINE SULFATE 2 MILLIGRAM(S): 50 CAPSULE, EXTENDED RELEASE ORAL at 11:38

## 2017-12-13 RX ADMIN — Medication 40 MILLIGRAM(S): at 21:38

## 2017-12-13 RX ADMIN — TAMSULOSIN HYDROCHLORIDE 0.4 MILLIGRAM(S): 0.4 CAPSULE ORAL at 21:37

## 2017-12-13 RX ADMIN — SIMVASTATIN 40 MILLIGRAM(S): 20 TABLET, FILM COATED ORAL at 21:37

## 2017-12-13 RX ADMIN — Medication 3 MILLILITER(S): at 21:11

## 2017-12-13 RX ADMIN — Medication 40 MILLIGRAM(S): at 11:08

## 2017-12-13 RX ADMIN — MORPHINE SULFATE 15 MILLIGRAM(S): 50 CAPSULE, EXTENDED RELEASE ORAL at 21:37

## 2017-12-13 RX ADMIN — Medication 3 MILLILITER(S): at 09:07

## 2017-12-13 RX ADMIN — MORPHINE SULFATE 2 MILLIGRAM(S): 50 CAPSULE, EXTENDED RELEASE ORAL at 18:47

## 2017-12-13 RX ADMIN — MORPHINE SULFATE 15 MILLIGRAM(S): 50 CAPSULE, EXTENDED RELEASE ORAL at 22:37

## 2017-12-13 RX ADMIN — Medication 3 MILLILITER(S): at 02:07

## 2017-12-13 NOTE — PROGRESS NOTE ADULT - ASSESSMENT
IMP:   ILD of ?etiology  responding clinically to IV steroids    Plan:  continue rx  f/u serologies  have discussed OFEV and esbiet with family post DC but pt not a good candidate  Hospice?

## 2017-12-13 NOTE — PROGRESS NOTE ADULT - SUBJECTIVE AND OBJECTIVE BOX
87 yo M with h/o idiopathic pulmonary fibrosis admitted to [facility] with respiratory distress. In ED pt placed on BiPAP. MICU eval notes poor prognosis as pt has been deteriorating significantly over the year. CXR showed diffuse b/l reticular nodules. CT chest showed negative for pulmonary emboli, severe chronic interstitial lung disease, likely IPF. Palliative care had conversation with family in which DNR/DNI and MOLST form.     Pt seen and examined at bedside. Pt ANO x 3 complains of some nausea/vomiting. Pt states breathing better. Still on high flow O2.     INTERVAL HPI/OVERNIGHT EVENTS: No events overnight.     MEDICATIONS  (STANDING):  ALBUTerol/ipratropium for Nebulization 3 milliLiter(s) Nebulizer every 6 hours  aspirin enteric coated 81 milliGRAM(s) Oral daily  methylPREDNISolone sodium succinate Injectable 40 milliGRAM(s) IV Push every 8 hours  morphine  - Injectable 2 milliGRAM(s) IV Push every 6 hours  morphine ER Tablet 15 milliGRAM(s) Oral every 8 hours  pantoprazole    Tablet 40 milliGRAM(s) Oral before breakfast  senna 2 Tablet(s) Oral at bedtime  sertraline 50 milliGRAM(s) Oral daily  simvastatin 40 milliGRAM(s) Oral at bedtime  tamsulosin 0.4 milliGRAM(s) Oral at bedtime  trimethoprim  160 mG/sulfamethoxazole 800 mG 1 Tablet(s) Oral two times a day    MEDICATIONS  (PRN):  morphine  - Injectable 4 milliGRAM(s) IV Push every 2 hours PRN shortness of breath/dyspnea      Allergies: No Known Allergies    PMH: see HPI    REVIEW OF SYSTEMS:  CONSTITUTIONAL: No fever, weight loss, or fatigue  RESPIRATORY: see HPI  CARDIOVASCULAR: No chest pain, palpitations, dizziness, or leg swelling  GASTROINTESTINAL: No abdominal or epigastric pain. No nausea, vomiting, or hematemesis; No diarrhea or constipation.   GENITOURINARY: No dysuria, frequency, hematuria, or incontinence  NEUROLOGICAL: No headaches, memory loss, loss of strength, numbness, or tremors    Vital Signs Last 24 Hrs  T(C): 36.8 (13 Dec 2017 07:43), Max: 37.4 (13 Dec 2017 02:54)  T(F): 98.2 (13 Dec 2017 07:43), Max: 99.4 (13 Dec 2017 02:54)  HR: 86 (13 Dec 2017 07:44) (72 - 90)  BP: 174/77 (13 Dec 2017 07:44) (147/66 - 174/77)  BP(mean): --  RR: 20 (13 Dec 2017 07:44) (18 - 24)  SpO2: 95% (13 Dec 2017 09:08) (93% - 96%)    PHYSICAL EXAM:  GENERAL: NAD, well-groomed, well-developed  HEAD:  Atraumatic, Normocephalic  NERVOUS SYSTEM:  Alert & Oriented X3, Good concentration;   CHEST/LUNG: Clear to auscultation bilaterally; No rales, rhonchi, wheezing, or rubs  HEART: Regular rate and rhythm; No murmurs, rubs, or gallops  ABDOMEN: Soft, Nontender, Nondistended; Bowel sounds present        LABS:                        13.0   11.9  )-----------( 232      ( 13 Dec 2017 09:55 )             40.9     13 Dec 2017 09:55    139    |  99     |  17.0   ----------------------------<  92     4.0     |  26.0   |  0.87     Ca    9.4        13 Dec 2017 09:55  Phos  2.3       13 Dec 2017 09:55  Mg     2.1       13 Dec 2017 09:55      CAPILLARY BLOOD GLUCOSE    RADIOLOGY & ADDITIONAL TESTS:       EXAM:  CHEST SINGLE VIEW FRONTAL                        PROCEDURE DATE:  12/12/2017   Impression: Diffuse bilateral reticulonodular infiltrates unchanged.       EXAM:  CT ANGIO CHEST (W)AW IC                        PROCEDURE DATE:  12/12/2017  IMPRESSION:  Negative for pulmonary emboli.  Chronic interstitial lung disease, likely IPF. Overall no significant   interval change from prior exam. 85 yo M with h/o idiopathic pulmonary fibrosis admitted to [facility] with respiratory distress. In ED pt placed on BiPAP. MICU eval notes poor prognosis as pt has been deteriorating significantly over the year. CXR showed diffuse b/l reticular nodules. CT chest showed negative for pulmonary emboli, severe chronic interstitial lung disease, likely IPF. Palliative care had conversation with family in which DNR/DNI and MOLST form.     Pt seen and examined at bedside. Pt ANO x 3 complains of some nausea/vomiting. Pt states breathing better. Still on high flow O2. Granddaughter at bedside.     INTERVAL HPI/OVERNIGHT EVENTS: No events overnight.     MEDICATIONS  (STANDING):  ALBUTerol/ipratropium for Nebulization 3 milliLiter(s) Nebulizer every 6 hours  aspirin enteric coated 81 milliGRAM(s) Oral daily  methylPREDNISolone sodium succinate Injectable 40 milliGRAM(s) IV Push every 8 hours  morphine  - Injectable 2 milliGRAM(s) IV Push every 6 hours  morphine ER Tablet 15 milliGRAM(s) Oral every 8 hours  pantoprazole    Tablet 40 milliGRAM(s) Oral before breakfast  senna 2 Tablet(s) Oral at bedtime  sertraline 50 milliGRAM(s) Oral daily  simvastatin 40 milliGRAM(s) Oral at bedtime  tamsulosin 0.4 milliGRAM(s) Oral at bedtime  trimethoprim  160 mG/sulfamethoxazole 800 mG 1 Tablet(s) Oral two times a day    MEDICATIONS  (PRN):  morphine  - Injectable 4 milliGRAM(s) IV Push every 2 hours PRN shortness of breath/dyspnea      Allergies: No Known Allergies    PMH: see HPI    REVIEW OF SYSTEMS:  CONSTITUTIONAL: No fever   RESPIRATORY: see HPI  CARDIOVASCULAR: No chest pain, palpitations     Vital Signs Last 24 Hrs  T(C): 36.8 (13 Dec 2017 07:43), Max: 37.4 (13 Dec 2017 02:54)  T(F): 98.2 (13 Dec 2017 07:43), Max: 99.4 (13 Dec 2017 02:54)  HR: 86 (13 Dec 2017 07:44) (72 - 90)  BP: 174/77 (13 Dec 2017 07:44) (147/66 - 174/77)  BP(mean): --  RR: 20 (13 Dec 2017 07:44) (18 - 24)  SpO2: 95% (13 Dec 2017 09:08) (93% - 96%)    PHYSICAL EXAM:  GENERAL: NAD, chronically ill appearing  HEAD:  Atraumatic, Normocephalic, poor dentition  NERVOUS SYSTEM:  Alert & Oriented X3, Good concentration;   CHEST/LUNG: diffuse fine rales  HEART: Regular rate and rhythm   ABDOMEN: Soft, Nontender, Nondistended; Bowel sounds present        LABS:                        13.0   11.9  )-----------( 232      ( 13 Dec 2017 09:55 )             40.9     13 Dec 2017 09:55    139    |  99     |  17.0   ----------------------------<  92     4.0     |  26.0   |  0.87     Ca    9.4        13 Dec 2017 09:55  Phos  2.3       13 Dec 2017 09:55  Mg     2.1       13 Dec 2017 09:55      CAPILLARY BLOOD GLUCOSE    RADIOLOGY & ADDITIONAL TESTS:       EXAM:  CHEST SINGLE VIEW FRONTAL                        PROCEDURE DATE:  12/12/2017   Impression: Diffuse bilateral reticulonodular infiltrates unchanged.       EXAM:  CT ANGIO CHEST (W)AW IC                        PROCEDURE DATE:  12/12/2017  IMPRESSION:  Negative for pulmonary emboli.  Chronic interstitial lung disease, likely IPF. Overall no significant   interval change from prior exam. 87 yo M with h/o idiopathic pulmonary fibrosis (previously on home hospice) admitted to [facility] with respiratory distress. In ED pt placed on BiPAP. MICU eval notes poor prognosis as pt has been deteriorating significantly over the year. CXR showed diffuse b/l reticular nodules. CT chest showed negative for pulmonary emboli, severe chronic interstitial lung disease, likely IPF. Palliative care had conversation with family in which DNR/DNI and MOLST form.     Pt seen and examined at bedside. Pt ANO x 3 complains of some nausea/vomiting. Pt states breathing better. Still on high flow O2. Granddaughter at bedside.     INTERVAL HPI/OVERNIGHT EVENTS: No events overnight.     MEDICATIONS  (STANDING):  ALBUTerol/ipratropium for Nebulization 3 milliLiter(s) Nebulizer every 6 hours  aspirin enteric coated 81 milliGRAM(s) Oral daily  methylPREDNISolone sodium succinate Injectable 40 milliGRAM(s) IV Push every 8 hours  morphine  - Injectable 2 milliGRAM(s) IV Push every 6 hours  morphine ER Tablet 15 milliGRAM(s) Oral every 8 hours  pantoprazole    Tablet 40 milliGRAM(s) Oral before breakfast  senna 2 Tablet(s) Oral at bedtime  sertraline 50 milliGRAM(s) Oral daily  simvastatin 40 milliGRAM(s) Oral at bedtime  tamsulosin 0.4 milliGRAM(s) Oral at bedtime  trimethoprim  160 mG/sulfamethoxazole 800 mG 1 Tablet(s) Oral two times a day    MEDICATIONS  (PRN):  morphine  - Injectable 4 milliGRAM(s) IV Push every 2 hours PRN shortness of breath/dyspnea      Allergies: No Known Allergies    PMH: see HPI    REVIEW OF SYSTEMS:  CONSTITUTIONAL: No fever   RESPIRATORY: see HPI  CARDIOVASCULAR: No chest pain, palpitations     Vital Signs Last 24 Hrs  T(C): 36.8 (13 Dec 2017 07:43), Max: 37.4 (13 Dec 2017 02:54)  T(F): 98.2 (13 Dec 2017 07:43), Max: 99.4 (13 Dec 2017 02:54)  HR: 86 (13 Dec 2017 07:44) (72 - 90)  BP: 174/77 (13 Dec 2017 07:44) (147/66 - 174/77)  BP(mean): --  RR: 20 (13 Dec 2017 07:44) (18 - 24)  SpO2: 95% (13 Dec 2017 09:08) (93% - 96%)    PHYSICAL EXAM:  GENERAL: NAD, chronically ill appearing  HEAD:  Atraumatic, Normocephalic, poor dentition  NERVOUS SYSTEM:  Alert & Oriented X3, Good concentration;   CHEST/LUNG: diffuse fine rales  HEART: Regular rate and rhythm   ABDOMEN: Soft, Nontender, Nondistended; Bowel sounds present        LABS:                        13.0   11.9  )-----------( 232      ( 13 Dec 2017 09:55 )             40.9     13 Dec 2017 09:55    139    |  99     |  17.0   ----------------------------<  92     4.0     |  26.0   |  0.87     Ca    9.4        13 Dec 2017 09:55  Phos  2.3       13 Dec 2017 09:55  Mg     2.1       13 Dec 2017 09:55      CAPILLARY BLOOD GLUCOSE    RADIOLOGY & ADDITIONAL TESTS:       EXAM:  CHEST SINGLE VIEW FRONTAL                        PROCEDURE DATE:  12/12/2017   Impression: Diffuse bilateral reticulonodular infiltrates unchanged.       EXAM:  CT ANGIO CHEST (W)AW IC                        PROCEDURE DATE:  12/12/2017  IMPRESSION:  Negative for pulmonary emboli.  Chronic interstitial lung disease, likely IPF. Overall no significant   interval change from prior exam.

## 2017-12-13 NOTE — PROGRESS NOTE ADULT - PROBLEM SELECTOR PLAN 2
See plan above  Responding to tx/steroids See plan above  Responding to tx/steroids  Hospice following

## 2017-12-13 NOTE — PROGRESS NOTE ADULT - ASSESSMENT
86M with advanced lung disease on home hospice here with respiratory distress related to progression of underlying disease.

## 2017-12-13 NOTE — PROGRESS NOTE ADULT - SUBJECTIVE AND OBJECTIVE BOX
PULMONARY PROGRESS NOTE      EDD SANTO-[id number]    Patient is a 86y old  Male who presents with a chief complaint of shortness of breath (12 Dec 2017 12:47)      INTERVAL HPI/OVERNIGHT EVENTS:  More comfortable today with less cough. No sputum  (family at bedside)    MEDICATIONS  (STANDING):  ALBUTerol/ipratropium for Nebulization 3 milliLiter(s) Nebulizer every 6 hours  aspirin enteric coated 81 milliGRAM(s) Oral daily  methylPREDNISolone sodium succinate Injectable 40 milliGRAM(s) IV Push every 8 hours  morphine  - Injectable 2 milliGRAM(s) IV Push every 6 hours  morphine ER Tablet 15 milliGRAM(s) Oral every 8 hours  pantoprazole    Tablet 40 milliGRAM(s) Oral before breakfast  senna 2 Tablet(s) Oral at bedtime  sertraline 50 milliGRAM(s) Oral daily  simvastatin 40 milliGRAM(s) Oral at bedtime  tamsulosin 0.4 milliGRAM(s) Oral at bedtime  trimethoprim  160 mG/sulfamethoxazole 800 mG 1 Tablet(s) Oral two times a day      MEDICATIONS  (PRN):  morphine  - Injectable 4 milliGRAM(s) IV Push every 2 hours PRN shortness of breath/dyspnea      Allergies    No Known Allergies    Intolerances        PAST MEDICAL & SURGICAL HISTORY:  Depression  BPH (benign prostatic hyperplasia)  Osteoarthritis  Pulmonary fibrosis  HTN (hypertension)  Hypercholesteremia  No significant past surgical history      SOCIAL HISTORY  Smoking History:       REVIEW OF SYSTEMS:    CONSTITUTIONAL:  No distress    HEENT:  Eyes:  No diplopia or blurred vision. ENT:  No earache, sore throat or runny nose.    CARDIOVASCULAR:  No pressure, squeezing, tightness, heaviness or aching about the chest; no palpitations.    RESPIRATORY:  above    GASTROINTESTINAL:  No nausea, vomiting or diarrhea.    GENITOURINARY:  No dysuria, frequency or urgency.    NEUROLOGIC:  No paresthesias, fasciculations, seizures or weakness.    Extremities: No cyanosis, clubbing or edema    PSYCHIATRIC:  No disorder of thought or mood.    Vital Signs Last 24 Hrs  T(C): 36.8 (13 Dec 2017 07:43), Max: 37.4 (13 Dec 2017 02:54)  T(F): 98.2 (13 Dec 2017 07:43), Max: 99.4 (13 Dec 2017 02:54)  HR: 86 (13 Dec 2017 07:44) (72 - 90)  BP: 174/77 (13 Dec 2017 07:44) (147/66 - 174/77)  BP(mean): --  RR: 20 (13 Dec 2017 07:44) (18 - 24)  SpO2: 95% (13 Dec 2017 09:08) (93% - 96%)    PHYSICAL EXAMINATION:    GENERAL: The patient is awake and alert in no apparent distress.     HEENT: Head is normocephalic and atraumatic. Extraocular muscles are intact. Mucous membranes are moist.    NECK: Supple.    LUNGS: Clear to auscultation without wheezing, rales or rhonchi; respirations unlabored    HEART: Regular rate and rhythm without murmur.    ABDOMEN: Soft, nontender, and nondistended.      EXTREMITIES: Without any cyanosis, clubbing, rash, lesions or edema.    NEUROLOGIC: Grossly intact.    LABS:                        13.0   11.9  )-----------( 232      ( 13 Dec 2017 09:55 )             40.9     12-13    139  |  99  |  17.0  ----------------------------<  92  4.0   |  26.0  |  0.87    Ca    9.4      13 Dec 2017 09:55  Phos  2.3     12-13  Mg     2.1     12-13    TPro  7.8  /  Alb  3.5  /  TBili  0.4  /  DBili  x   /  AST  73<H>  /  ALT  33  /  AlkPhos  103  12-12        ABG - ( 12 Dec 2017 03:00 )  pH: 7.34  /  pCO2: 43    /  pO2: 71    / HCO3: 22    / Base Excess: -2.8  /  SaO2: 93                CARDIAC MARKERS ( 12 Dec 2017 02:46 )  x     / 0.06 ng/mL / 33 U/L / x     / x            Serum Pro-Brain Natriuretic Peptide: 107 pg/mL (12-12-17 @ 02:46)          MICROBIOLOGY:    RADIOLOGY & ADDITIONAL STUDIES:  < from: CT Angio Chest w/ IV Cont (12.12.17 @ 05:02) >     EXAM:  CT ANGIO CHEST (W)AW IC                          PROCEDURE DATE:  12/12/2017          INTERPRETATION:  CT ANGIO CHEST (W)AW IC     INDICATION: Acute hypoxia. Evaluate for PE.    TECHNIQUE: Contrast enhanced CT imaging of the thorax. Timing optimized   for the pulmonary arteries. Postprocessed MIP reformatted images were   created and reviewed.    75 mL of Omnipaque 350 contrast material was injected IV.    COMPARISON: Prior CT chest 9/1/2016.    FINDINGS:      Pulmonary arteries: No filling defects to suggest pulmonary emboli.  Aorta: No aneurysm. Marked atherosclerotic disease of the aorta and its   branches.    Lines and tubes: None.  Airways: Tracheobronchial tree is patent.  Lungs and Pleura: There are peripheral cystic changes seen throughout the   lungs, calcified hyperdense lesion in the posterior right upper lobe is   stable from prior exam likely related to pleural calcification. There are   mild groundglass opacity seen in the bilateral lower lobes. There is   associated traction bronchiectasis.    Mediastinum and lymph nodes: No mass or hemorrhage. No worsening   mediastinal adenopathy with multiple stable prominent mediastinal nodes.   No worrisome hilar or axillary adenopathy.  Heart: Stable cardiomegaly.. No pericardial effusion.    Upper Abdomen: No suspicious abnormalities of the visualized portions of   the liver, spleen, adrenal glands, or kidneys.    Bones and soft tissues: No suspicious lesions. Multiple age-indeterminate   but likely chronic compression deformities throughout the thoracic spine.    IMPRESSION:    Negative for pulmonary emboli.    Chronic interstitial lung disease, likely IPF. Overall no significant   interval change from prior exam.                KARLA SALGADO M.D., ATTENDING RADIOLOGIST  This document has been electronically signed. Dec 12 2017  5:22AM    films reviewed

## 2017-12-13 NOTE — ED ADULT NURSE REASSESSMENT NOTE - NS ED NURSE REASSESS COMMENT FT1
pt. received from night RN. pt. sleeping,. easy to arouse. pt. states he is feeling better. pt. sating well on hi trino NC 95%. pt. in no apparent distress at this time. awaiting bed. will continue to monitor. pt. received from night RN. pt. sleeping,. easy to arouse. pt. states he is feeling better. pt. sating well on hi flow NC 95%. pt. in no apparent distress at this time. awaiting bed. will continue to monitor.

## 2017-12-13 NOTE — ED ADULT NURSE REASSESSMENT NOTE - NS ED NURSE REASSESS COMMENT FT1
ERIC Story made aware that family does not think pt. will tolerate PO medications. as per PA. will order speech and swallow eval. holding medications at this time.

## 2017-12-13 NOTE — ED ADULT NURSE REASSESSMENT NOTE - NS ED NURSE REASSESS COMMENT FT1
rn spoke to Emergency Department respiratory therapist to put pt back on high flow. Pt keeps desating to low 80s while on 4L NC. as per Malinda she will change pt to high flow. will continue to monitor.

## 2017-12-13 NOTE — ED ADULT NURSE REASSESSMENT NOTE - NS ED NURSE REASSESS COMMENT FT1
ERIC Story made aware that family does not think pt. will tolerate PO medications. as per PA. will order speech and swallow eval. holding medications at this time

## 2017-12-13 NOTE — PROGRESS NOTE ADULT - ASSESSMENT
87 yo M with h/o idiopathic pulmonary fibrosis admitted to [facility] with respiratory distress. In ED pt placed on BiPAP. MICU eval notes poor prognosis as pt has been deteriorating significantly over the year. CXR showed diffuse b/l reticular nodules. CT chest showed negative for pulmonary emboli, severe chronic interstitial lung disease, likely IPF. Palliative care had conversation with family in which DNR/DNI and MOLST form. 87 yo M with h/o idiopathic pulmonary fibrosis (previously on home hospice) admitted to [facility] with respiratory distress. In ED pt placed on BiPAP. MICU eval notes poor prognosis as pt has been deteriorating significantly over the year. CXR showed diffuse b/l reticular nodules. CT chest showed negative for pulmonary emboli, severe chronic interstitial lung disease, likely IPF. Palliative care had conversation with family in which DNR/DNI and MOLST form.

## 2017-12-13 NOTE — PROGRESS NOTE ADULT - SUBJECTIVE AND OBJECTIVE BOX
OVERNIGHT EVENTS: still on high flow     Present Symptoms:     Dyspnea: 0  Nausea/Vomiting: No  Anxiety:  No  Depression: No  Fatigue: Yes   Loss of appetite: No    Pain: none             Character-            Duration-            Effect-            Factors-            Frequency-            Location-            Severity-    Review of Systems: Reviewed                         Positive: dyspnea   All others negative    MEDICATIONS  (STANDING):  ALBUTerol/ipratropium for Nebulization 3 milliLiter(s) Nebulizer every 6 hours  aspirin enteric coated 81 milliGRAM(s) Oral daily  methylPREDNISolone sodium succinate Injectable 40 milliGRAM(s) IV Push every 8 hours  morphine  - Injectable 2 milliGRAM(s) IV Push every 6 hours  morphine ER Tablet 15 milliGRAM(s) Oral every 8 hours  pantoprazole    Tablet 40 milliGRAM(s) Oral before breakfast  senna 2 Tablet(s) Oral at bedtime  sertraline 50 milliGRAM(s) Oral daily  simvastatin 40 milliGRAM(s) Oral at bedtime  tamsulosin 0.4 milliGRAM(s) Oral at bedtime  trimethoprim  160 mG/sulfamethoxazole 800 mG 1 Tablet(s) Oral two times a day    MEDICATIONS  (PRN):  morphine  - Injectable 4 milliGRAM(s) IV Push every 2 hours PRN shortness of breath/dyspnea    PHYSICAL EXAM:    Vital Signs Last 24 Hrs  T(C): 36.8 (13 Dec 2017 07:43), Max: 37.4 (13 Dec 2017 02:54)  T(F): 98.2 (13 Dec 2017 07:43), Max: 99.4 (13 Dec 2017 02:54)  HR: 86 (13 Dec 2017 07:44) (70 - 90)  BP: 174/77 (13 Dec 2017 07:44) (114/57 - 174/77)  BP(mean): 77 (12 Dec 2017 12:47) (77 - 77)  RR: 20 (13 Dec 2017 07:44) (18 - 24)  SpO2: 95% (13 Dec 2017 09:08) (93% - 96%)    General: alert     Karnofsky:  20%    HEENT: normal      Lungs: comfortable     CV: normal      GI: normal  distended  tender  no BS               PEG/NG/OG tube  constipation  last BM:     : normal  incontinent  oliguria/anuria  cordoba    MSK: normal  weakness  edema             ambulatory  bedbound/wheelchair bound    Skin: normal  pressure ulcers- Stage_____  no rash    LABS:                      13.0   11.9  )-----------( 232      ( 13 Dec 2017 09:55 )             40.9     12-13    139  |  99  |  17.0  ----------------------------<  92  4.0   |  26.0  |  0.87    Ca    9.4      13 Dec 2017 09:55  Phos  2.3     12-13  Mg     2.1     12-13    TPro  7.8  /  Alb  3.5  /  TBili  0.4  /  DBili  x   /  AST  73<H>  /  ALT  33  /  AlkPhos  103  12-12    I&O's Summary      RADIOLOGY & ADDITIONAL STUDIES:    ADVANCE DIRECTIVES:   DNR YES NO  Completed on:                     MOLST  YES NO   Completed on:  Living Will  YES NO   Completed on: OVERNIGHT EVENTS: still on high flow     Present Symptoms:     Dyspnea: 0  Nausea/Vomiting: No  Anxiety:  No  Depression: No  Fatigue: Yes   Loss of appetite: No    Pain: none             Character-            Duration-            Effect-            Factors-            Frequency-            Location-            Severity-    Review of Systems: Reviewed                         Positive: dyspnea   All others negative    MEDICATIONS  (STANDING):  ALBUTerol/ipratropium for Nebulization 3 milliLiter(s) Nebulizer every 6 hours  aspirin enteric coated 81 milliGRAM(s) Oral daily  methylPREDNISolone sodium succinate Injectable 40 milliGRAM(s) IV Push every 8 hours  morphine  - Injectable 2 milliGRAM(s) IV Push every 6 hours  morphine ER Tablet 15 milliGRAM(s) Oral every 8 hours  pantoprazole    Tablet 40 milliGRAM(s) Oral before breakfast  senna 2 Tablet(s) Oral at bedtime  sertraline 50 milliGRAM(s) Oral daily  simvastatin 40 milliGRAM(s) Oral at bedtime  tamsulosin 0.4 milliGRAM(s) Oral at bedtime  trimethoprim  160 mG/sulfamethoxazole 800 mG 1 Tablet(s) Oral two times a day    MEDICATIONS  (PRN):  morphine  - Injectable 4 milliGRAM(s) IV Push every 2 hours PRN shortness of breath/dyspnea    PHYSICAL EXAM:    Vital Signs Last 24 Hrs  T(C): 36.8 (13 Dec 2017 07:43), Max: 37.4 (13 Dec 2017 02:54)  T(F): 98.2 (13 Dec 2017 07:43), Max: 99.4 (13 Dec 2017 02:54)  HR: 86 (13 Dec 2017 07:44) (70 - 90)  BP: 174/77 (13 Dec 2017 07:44) (114/57 - 174/77)  BP(mean): 77 (12 Dec 2017 12:47) (77 - 77)  RR: 20 (13 Dec 2017 07:44) (18 - 24)  SpO2: 95% (13 Dec 2017 09:08) (93% - 96%)    General: alert     Karnofsky:  20%    HEENT: normal      Lungs: comfortable     CV: normal      GI: normal      : normal     MSK: weakness     Skin: no rash    LABS:                      13.0   11.9  )-----------( 232      ( 13 Dec 2017 09:55 )             40.9     12-13    139  |  99  |  17.0  ----------------------------<  92  4.0   |  26.0  |  0.87    Ca    9.4      13 Dec 2017 09:55  Phos  2.3     12-13  Mg     2.1     12-13    TPro  7.8  /  Alb  3.5  /  TBili  0.4  /  DBili  x   /  AST  73<H>  /  ALT  33  /  AlkPhos  103  12-12    I&O's Summary    RADIOLOGY & ADDITIONAL STUDIES:    ADVANCE DIRECTIVES: DNR/I

## 2017-12-13 NOTE — ED ADULT NURSE REASSESSMENT NOTE - NS ED NURSE REASSESS COMMENT FT1
pt was switched to bipap at this time. Pt keeps trying to take mask off to wipe his mouth. Pt was educated with an  the importance of keeping mask on. Pt states he understands. Pt is resting in bed comfortably at this time, no apparent distress noted at this time. pt remains Normal Sinus Rhythm on cardiac monitor. Pt denies any complaints at this time. Plan of care explained, will continue to monitor.

## 2017-12-13 NOTE — ED ADULT NURSE REASSESSMENT NOTE - NS ED NURSE REASSESS COMMENT FT1
RN was not aware that there were two separate charts until spoke to palliative nurse. made aware of medications that are to be given. RN was not aware that there were two separate charts until spoke to palliative MD. made aware of medications that are to be given.

## 2017-12-13 NOTE — ED ADULT NURSE REASSESSMENT NOTE - NS ED NURSE REASSESS COMMENT FT1
pt switched to high flow oxygen at this time by resp therapist Malinda. Pt is 92% pulse ox. pt remains in stable condition at this time. plan of care explained, will continue to monitor.

## 2017-12-14 LAB
ANA PAT FLD IF-IMP: ABNORMAL
ANA PATTERN 2: ABNORMAL
ANA TITR SER: ABNORMAL
ANION GAP SERPL CALC-SCNC: 14 MMOL/L — SIGNIFICANT CHANGE UP (ref 5–17)
ANTI NUCLEAR FACTOR TITER 2: ABNORMAL
AUTO DIFF PNL BLD: NEGATIVE — SIGNIFICANT CHANGE UP
BUN SERPL-MCNC: 20 MG/DL — SIGNIFICANT CHANGE UP (ref 8–20)
C-ANCA SER-ACNC: POSITIVE
C-ANCA TITR SER: ABNORMAL
CALCIUM SERPL-MCNC: 9.5 MG/DL — SIGNIFICANT CHANGE UP (ref 8.6–10.2)
CHLORIDE SERPL-SCNC: 101 MMOL/L — SIGNIFICANT CHANGE UP (ref 98–107)
CO2 SERPL-SCNC: 25 MMOL/L — SIGNIFICANT CHANGE UP (ref 22–29)
CREAT SERPL-MCNC: 0.77 MG/DL — SIGNIFICANT CHANGE UP (ref 0.5–1.3)
EOSINOPHIL # BLD AUTO: 0 K/UL — SIGNIFICANT CHANGE UP (ref 0–0.5)
EOSINOPHIL NFR BLD AUTO: 0 % — SIGNIFICANT CHANGE UP (ref 0–5)
GLUCOSE SERPL-MCNC: 134 MG/DL — HIGH (ref 70–115)
HCT VFR BLD CALC: 39.5 % — LOW (ref 42–52)
HGB BLD-MCNC: 12.8 G/DL — LOW (ref 14–18)
HIV 1 & 2 AB SERPL IA.RAPID: SIGNIFICANT CHANGE UP
LYMPHOCYTES # BLD AUTO: 1.1 K/UL — SIGNIFICANT CHANGE UP (ref 1–4.8)
LYMPHOCYTES # BLD AUTO: 10.2 % — LOW (ref 20–55)
MAGNESIUM SERPL-MCNC: 2.2 MG/DL — SIGNIFICANT CHANGE UP (ref 1.8–2.6)
MCHC RBC-ENTMCNC: 29.8 PG — SIGNIFICANT CHANGE UP (ref 27–31)
MCHC RBC-ENTMCNC: 32.4 G/DL — SIGNIFICANT CHANGE UP (ref 32–36)
MCV RBC AUTO: 91.9 FL — SIGNIFICANT CHANGE UP (ref 80–94)
MONOCYTES # BLD AUTO: 0.4 K/UL — SIGNIFICANT CHANGE UP (ref 0–0.8)
MONOCYTES NFR BLD AUTO: 3.1 % — SIGNIFICANT CHANGE UP (ref 3–10)
NEUTROPHILS # BLD AUTO: 9.6 K/UL — HIGH (ref 1.8–8)
NEUTROPHILS NFR BLD AUTO: 86.5 % — HIGH (ref 37–73)
P-ANCA SER-ACNC: NEGATIVE — SIGNIFICANT CHANGE UP
PHOSPHATE SERPL-MCNC: 3.6 MG/DL — SIGNIFICANT CHANGE UP (ref 2.4–4.7)
PLATELET # BLD AUTO: 231 K/UL — SIGNIFICANT CHANGE UP (ref 150–400)
POTASSIUM SERPL-MCNC: 4.4 MMOL/L — SIGNIFICANT CHANGE UP (ref 3.5–5.3)
POTASSIUM SERPL-SCNC: 4.4 MMOL/L — SIGNIFICANT CHANGE UP (ref 3.5–5.3)
RBC # BLD: 4.3 M/UL — LOW (ref 4.6–6.2)
RBC # FLD: 15.2 % — SIGNIFICANT CHANGE UP (ref 11–15.6)
SODIUM SERPL-SCNC: 140 MMOL/L — SIGNIFICANT CHANGE UP (ref 135–145)
WBC # BLD: 11.2 K/UL — HIGH (ref 4.8–10.8)
WBC # FLD AUTO: 11.2 K/UL — HIGH (ref 4.8–10.8)

## 2017-12-14 PROCEDURE — 99233 SBSQ HOSP IP/OBS HIGH 50: CPT

## 2017-12-14 PROCEDURE — 99232 SBSQ HOSP IP/OBS MODERATE 35: CPT

## 2017-12-14 RX ADMIN — Medication 1 TABLET(S): at 18:51

## 2017-12-14 RX ADMIN — MORPHINE SULFATE 15 MILLIGRAM(S): 50 CAPSULE, EXTENDED RELEASE ORAL at 06:49

## 2017-12-14 RX ADMIN — TAMSULOSIN HYDROCHLORIDE 0.4 MILLIGRAM(S): 0.4 CAPSULE ORAL at 22:53

## 2017-12-14 RX ADMIN — Medication 3 MILLILITER(S): at 15:55

## 2017-12-14 RX ADMIN — MORPHINE SULFATE 2 MILLIGRAM(S): 50 CAPSULE, EXTENDED RELEASE ORAL at 02:18

## 2017-12-14 RX ADMIN — MORPHINE SULFATE 2 MILLIGRAM(S): 50 CAPSULE, EXTENDED RELEASE ORAL at 02:03

## 2017-12-14 RX ADMIN — SERTRALINE 50 MILLIGRAM(S): 25 TABLET, FILM COATED ORAL at 14:09

## 2017-12-14 RX ADMIN — MORPHINE SULFATE 2 MILLIGRAM(S): 50 CAPSULE, EXTENDED RELEASE ORAL at 08:34

## 2017-12-14 RX ADMIN — Medication 3 MILLILITER(S): at 09:22

## 2017-12-14 RX ADMIN — MORPHINE SULFATE 2 MILLIGRAM(S): 50 CAPSULE, EXTENDED RELEASE ORAL at 23:46

## 2017-12-14 RX ADMIN — Medication 3 MILLILITER(S): at 03:05

## 2017-12-14 RX ADMIN — Medication 1 TABLET(S): at 06:49

## 2017-12-14 RX ADMIN — MORPHINE SULFATE 2 MILLIGRAM(S): 50 CAPSULE, EXTENDED RELEASE ORAL at 18:17

## 2017-12-14 RX ADMIN — MORPHINE SULFATE 15 MILLIGRAM(S): 50 CAPSULE, EXTENDED RELEASE ORAL at 22:52

## 2017-12-14 RX ADMIN — Medication 3 MILLILITER(S): at 15:57

## 2017-12-14 RX ADMIN — MORPHINE SULFATE 15 MILLIGRAM(S): 50 CAPSULE, EXTENDED RELEASE ORAL at 14:09

## 2017-12-14 RX ADMIN — Medication 81 MILLIGRAM(S): at 12:28

## 2017-12-14 RX ADMIN — Medication 3 MILLILITER(S): at 21:00

## 2017-12-14 RX ADMIN — MORPHINE SULFATE 15 MILLIGRAM(S): 50 CAPSULE, EXTENDED RELEASE ORAL at 14:50

## 2017-12-14 RX ADMIN — MORPHINE SULFATE 2 MILLIGRAM(S): 50 CAPSULE, EXTENDED RELEASE ORAL at 12:28

## 2017-12-14 RX ADMIN — MORPHINE SULFATE 2 MILLIGRAM(S): 50 CAPSULE, EXTENDED RELEASE ORAL at 17:48

## 2017-12-14 RX ADMIN — Medication 40 MILLIGRAM(S): at 17:48

## 2017-12-14 RX ADMIN — SIMVASTATIN 40 MILLIGRAM(S): 20 TABLET, FILM COATED ORAL at 22:53

## 2017-12-14 RX ADMIN — MORPHINE SULFATE 15 MILLIGRAM(S): 50 CAPSULE, EXTENDED RELEASE ORAL at 07:44

## 2017-12-14 RX ADMIN — Medication 3 MILLILITER(S): at 09:25

## 2017-12-14 RX ADMIN — MORPHINE SULFATE 2 MILLIGRAM(S): 50 CAPSULE, EXTENDED RELEASE ORAL at 08:48

## 2017-12-14 RX ADMIN — MORPHINE SULFATE 2 MILLIGRAM(S): 50 CAPSULE, EXTENDED RELEASE ORAL at 13:30

## 2017-12-14 RX ADMIN — PANTOPRAZOLE SODIUM 40 MILLIGRAM(S): 20 TABLET, DELAYED RELEASE ORAL at 06:49

## 2017-12-14 RX ADMIN — Medication 40 MILLIGRAM(S): at 06:49

## 2017-12-14 RX ADMIN — SENNA PLUS 2 TABLET(S): 8.6 TABLET ORAL at 22:53

## 2017-12-14 NOTE — SWALLOW BEDSIDE ASSESSMENT ADULT - SLP GENERAL OBSERVATIONS
Pt received & seen seated upright in bed, +awake/alert, +O2 via high flow nasal canula, +oriented, +granddaughter present, +language line ID: [id number] used for Dutch interpretation

## 2017-12-14 NOTE — PROGRESS NOTE ADULT - SUBJECTIVE AND OBJECTIVE BOX
CHRISTINA SANTO    [id number]    86y      Male    INTERVAL HPI/OVERNIGHT EVENTS: No events on. Refusing bedside services. Wants [facility] to translate. Offers no complaints.    Hospital course:  87 yo M with h/o idiopathic pulmonary fibrosis (previously on home hospice) admitted to [facility] with respiratory distress. In ED pt placed on BiPAP. MICU eval notes poor prognosis as pt has been deteriorating significantly over the year. CXR showed diffuse b/l reticular nodules. CT chest showed negative for pulmonary emboli, severe chronic interstitial lung disease, likely IPF. Palliative care had conversation with family in which DNR/DNI and MOLST form.       REVIEW OF SYSTEMS:    CONSTITUTIONAL: No fever   CARDIOVASCULAR: No chest pain     Vital Signs Last 24 Hrs  T(C): 36.7 (14 Dec 2017 10:05), Max: 37.1 (14 Dec 2017 04:10)  T(F): 98.1 (14 Dec 2017 10:05), Max: 98.7 (14 Dec 2017 04:10)  HR: 68 (14 Dec 2017 10:05) (68 - 79)  BP: 148/70 (14 Dec 2017 10:05) (148/70 - 190/86)  BP(mean): --  RR: 16 (14 Dec 2017 10:05) (16 - 22)  SpO2: 98% (14 Dec 2017 10:05) (94% - 98%)    PHYSICAL EXAM:    GENERAL: NAD   HEENT: PERRL, +EOMI  CHEST/LUNG: diffuse fine rales  HEART: S1S2+, Regular rate and rhythm   ABDOMEN: Soft, Nontender, Nondistended; Bowel sounds present       LABS:                        12.8   11.2  )-----------( 231      ( 14 Dec 2017 06:21 )             39.5     12-14    140  |  101  |  20.0  ----------------------------<  134<H>  4.4   |  25.0  |  0.77    Ca    9.5      14 Dec 2017 06:21  Phos  3.6     12-14  Mg     2.2     12-14              MEDICATIONS  (STANDING):  ALBUTerol/ipratropium for Nebulization 3 milliLiter(s) Nebulizer every 6 hours  ALBUTerol/ipratropium for Nebulization 3 milliLiter(s) Nebulizer every 6 hours  aspirin enteric coated 81 milliGRAM(s) Oral daily  influenza   Vaccine 0.5 milliLiter(s) IntraMuscular once  methylPREDNISolone sodium succinate Injectable 40 milliGRAM(s) IV Push every 12 hours  morphine  - Injectable 2 milliGRAM(s) IV Push every 6 hours  morphine ER Tablet 15 milliGRAM(s) Oral every 8 hours  pantoprazole    Tablet 40 milliGRAM(s) Oral before breakfast  senna 2 Tablet(s) Oral at bedtime  sertraline 50 milliGRAM(s) Oral daily  simvastatin 40 milliGRAM(s) Oral at bedtime  tamsulosin 0.4 milliGRAM(s) Oral at bedtime  trimethoprim  160 mG/sulfamethoxazole 800 mG 1 Tablet(s) Oral two times a day    MEDICATIONS  (PRN):  morphine  - Injectable 4 milliGRAM(s) IV Push every 2 hours PRN shortness of breath/dyspnea      RADIOLOGY & ADDITIONAL TESTS:

## 2017-12-14 NOTE — PROGRESS NOTE ADULT - ASSESSMENT
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

## 2017-12-14 NOTE — SWALLOW BEDSIDE ASSESSMENT ADULT - SWALLOW EVAL: RECOMMENDED FEEDING/EATING TECHNIQUES
small sips/bites/position upright (90 degrees)/maintain upright posture during/after eating for 30 mins

## 2017-12-14 NOTE — PROGRESS NOTE ADULT - ASSESSMENT
87 yo M with h/o idiopathic pulmonary fibrosis (previously on home hospice) admitted to [facility] with respiratory distress. In ED pt placed on BiPAP. MICU eval notes poor prognosis as pt has been deteriorating significantly over the year. CXR showed diffuse b/l reticular nodules. CT chest showed negative for pulmonary emboli, severe chronic interstitial lung disease, likely IPF. Palliative care had conversation with family in which DNR/DNI and MOLST form.

## 2017-12-14 NOTE — PROGRESS NOTE ADULT - SUBJECTIVE AND OBJECTIVE BOX
PULMONARY PROGRESS NOTE      EDD SANTO-[id number]    Patient is a 86y old  Male who presents with a chief complaint of shortness of breath (12 Dec 2017 12:47)      INTERVAL HPI/OVERNIGHT EVENTS:      MEDICATIONS  (STANDING):  ALBUTerol/ipratropium for Nebulization 3 milliLiter(s) Nebulizer every 6 hours  ALBUTerol/ipratropium for Nebulization 3 milliLiter(s) Nebulizer every 6 hours  aspirin enteric coated 81 milliGRAM(s) Oral daily  influenza   Vaccine 0.5 milliLiter(s) IntraMuscular once  methylPREDNISolone sodium succinate Injectable 40 milliGRAM(s) IV Push every 8 hours  morphine  - Injectable 2 milliGRAM(s) IV Push every 6 hours  morphine ER Tablet 15 milliGRAM(s) Oral every 8 hours  pantoprazole    Tablet 40 milliGRAM(s) Oral before breakfast  senna 2 Tablet(s) Oral at bedtime  sertraline 50 milliGRAM(s) Oral daily  simvastatin 40 milliGRAM(s) Oral at bedtime  tamsulosin 0.4 milliGRAM(s) Oral at bedtime  trimethoprim  160 mG/sulfamethoxazole 800 mG 1 Tablet(s) Oral two times a day      MEDICATIONS  (PRN):  morphine  - Injectable 4 milliGRAM(s) IV Push every 2 hours PRN shortness of breath/dyspnea      Allergies    No Known Allergies    Intolerances        PAST MEDICAL & SURGICAL HISTORY:  Depression  BPH (benign prostatic hyperplasia)  Osteoarthritis  Pulmonary fibrosis  HTN (hypertension)  Hypercholesteremia  No significant past surgical history      SOCIAL HISTORY  Smoking History:       REVIEW OF SYSTEMS:    CONSTITUTIONAL:  No distress    HEENT:  Eyes:  No diplopia or blurred vision. ENT:  No earache, sore throat or runny nose.    CARDIOVASCULAR:  No pressure, squeezing, tightness, heaviness or aching about the chest; no palpitations.    RESPIRATORY:  No cough, shortness of breath, PND or orthopnea. Mild SOBOE    GASTROINTESTINAL:  No nausea, vomiting or diarrhea.    GENITOURINARY:  No dysuria, frequency or urgency.    NEUROLOGIC:  No paresthesias, fasciculations, seizures or weakness.    Extremities: No cyanosis, clubbing or edema    PSYCHIATRIC:  No disorder of thought or mood.    Vital Signs Last 24 Hrs  T(C): 37.1 (14 Dec 2017 04:10), Max: 37.1 (14 Dec 2017 04:10)  T(F): 98.7 (14 Dec 2017 04:10), Max: 98.7 (14 Dec 2017 04:10)  HR: 72 (14 Dec 2017 04:10) (71 - 79)  BP: 148/74 (14 Dec 2017 04:10) (148/74 - 190/86)  BP(mean): --  RR: 19 (14 Dec 2017 04:10) (19 - 22)  SpO2: 98% (14 Dec 2017 04:10) (94% - 98%)    PHYSICAL EXAMINATION:    GENERAL: The patient is awake and alert in no apparent distress.     HEENT: Head is normocephalic and atraumatic. Extraocular muscles are intact. Mucous membranes are moist.    NECK: Supple.    LUNGS: Clear to auscultation without wheezing, rales or rhonchi; respirations unlabored    HEART: Regular rate and rhythm without murmur.    ABDOMEN: Soft, nontender, and nondistended.      EXTREMITIES: Without any cyanosis, clubbing, rash, lesions or edema.    NEUROLOGIC: Grossly intact.    LABS:                        12.8   11.2  )-----------( 231      ( 14 Dec 2017 06:21 )             39.5     12-14    140  |  101  |  20.0  ----------------------------<  134<H>  4.4   |  25.0  |  0.77    Ca    9.5      14 Dec 2017 06:21  Phos  3.6     12-14  Mg     2.2     12-14                      Serum Pro-Brain Natriuretic Peptide: 107 pg/mL (12-12-17 @ 02:46)          MICROBIOLOGY:    RADIOLOGY & ADDITIONAL STUDIES: PULMONARY PROGRESS NOTE      EDD SANTO-[id number]    Patient is a 86y old  Male who presents with a chief complaint of shortness of breath (12 Dec 2017 12:47)      INTERVAL HPI/OVERNIGHT EVENTS:  On 50% [employer]  Less SOB  No chest pain      MEDICATIONS  (STANDING):  ALBUTerol/ipratropium for Nebulization 3 milliLiter(s) Nebulizer every 6 hours  ALBUTerol/ipratropium for Nebulization 3 milliLiter(s) Nebulizer every 6 hours  aspirin enteric coated 81 milliGRAM(s) Oral daily  influenza   Vaccine 0.5 milliLiter(s) IntraMuscular once  methylPREDNISolone sodium succinate Injectable 40 milliGRAM(s) IV Push every 8 hours  morphine  - Injectable 2 milliGRAM(s) IV Push every 6 hours  morphine ER Tablet 15 milliGRAM(s) Oral every 8 hours  pantoprazole    Tablet 40 milliGRAM(s) Oral before breakfast  senna 2 Tablet(s) Oral at bedtime  sertraline 50 milliGRAM(s) Oral daily  simvastatin 40 milliGRAM(s) Oral at bedtime  tamsulosin 0.4 milliGRAM(s) Oral at bedtime  trimethoprim  160 mG/sulfamethoxazole 800 mG 1 Tablet(s) Oral two times a day      MEDICATIONS  (PRN):  morphine  - Injectable 4 milliGRAM(s) IV Push every 2 hours PRN shortness of breath/dyspnea      Allergies    No Known Allergies    Intolerances        PAST MEDICAL & SURGICAL HISTORY:  Depression  BPH (benign prostatic hyperplasia)  Osteoarthritis  Pulmonary fibrosis  HTN (hypertension)  Hypercholesteremia  No significant past surgical history      SOCIAL HISTORY  Smoking History:       REVIEW OF SYSTEMS:    CONSTITUTIONAL:  No distress    HEENT:  Eyes:  No diplopia or blurred vision. ENT:  No earache, sore throat or runny nose.    CARDIOVASCULAR:  No pressure, squeezing, tightness, heaviness or aching about the chest; no palpitations.    RESPIRATORY:  No cough, shortness of breath, PND or orthopnea. Mild SOBOE    GASTROINTESTINAL:  No nausea, vomiting or diarrhea.    GENITOURINARY:  No dysuria, frequency or urgency.    NEUROLOGIC:  No paresthesias, fasciculations, seizures or weakness.    Extremities: No cyanosis, clubbing or edema    PSYCHIATRIC:  No disorder of thought or mood.    Vital Signs Last 24 Hrs  T(C): 37.1 (14 Dec 2017 04:10), Max: 37.1 (14 Dec 2017 04:10)  T(F): 98.7 (14 Dec 2017 04:10), Max: 98.7 (14 Dec 2017 04:10)  HR: 72 (14 Dec 2017 04:10) (71 - 79)  BP: 148/74 (14 Dec 2017 04:10) (148/74 - 190/86)  BP(mean): --  RR: 19 (14 Dec 2017 04:10) (19 - 22)  SpO2: 98% (14 Dec 2017 04:10) (94% - 98%)    PHYSICAL EXAMINATION:    GENERAL: The patient is awake and alert in no apparent distress.     HEENT: Head is normocephalic and atraumatic. Extraocular muscles are intact. Mucous membranes are moist.    NECK: Supple.    LUNGS: Clear to auscultation without wheezing, rales or rhonchi; respirations unlabored    HEART: Regular rate and rhythm without murmur.    ABDOMEN: Soft, nontender, and nondistended.      EXTREMITIES: Without any cyanosis, clubbing, rash, lesions or edema.    NEUROLOGIC: Grossly intact.    LABS:                        12.8   11.2  )-----------( 231      ( 14 Dec 2017 06:21 )             39.5     12-14    140  |  101  |  20.0  ----------------------------<  134<H>  4.4   |  25.0  |  0.77    Ca    9.5      14 Dec 2017 06:21  Phos  3.6     12-14  Mg     2.2     12-14                      Serum Pro-Brain Natriuretic Peptide: 107 pg/mL (12-12-17 @ 02:46)          MICROBIOLOGY:    RADIOLOGY & ADDITIONAL STUDIES: reviewed

## 2017-12-14 NOTE — SWALLOW BEDSIDE ASSESSMENT ADULT - ASR SWALLOW ASPIRATION MONITOR
oral hygiene/position upright (89Y)/gurgly voice/upper respiratory infection/change of breathing pattern/cough/fever/throat clearing/pneumonia

## 2017-12-14 NOTE — SWALLOW BEDSIDE ASSESSMENT ADULT - COMMENTS
87 yo M with h/o idiopathic pulmonary fibrosis here with IPF exacerbation and worsening decline over the past year.

## 2017-12-15 ENCOUNTER — TRANSCRIPTION ENCOUNTER (OUTPATIENT)
Age: 82
End: 2017-12-15

## 2017-12-15 LAB — RHEUMATOID FACT SERPL-ACNC: <7 IU/ML — SIGNIFICANT CHANGE UP (ref 0–13.9)

## 2017-12-15 PROCEDURE — 99232 SBSQ HOSP IP/OBS MODERATE 35: CPT

## 2017-12-15 PROCEDURE — 99233 SBSQ HOSP IP/OBS HIGH 50: CPT

## 2017-12-15 RX ORDER — SERTRALINE 25 MG/1
1 TABLET, FILM COATED ORAL
Qty: 30 | Refills: 0 | OUTPATIENT
Start: 2017-12-15 | End: 2018-01-13

## 2017-12-15 RX ORDER — IPRATROPIUM/ALBUTEROL SULFATE 18-103MCG
3 AEROSOL WITH ADAPTER (GRAM) INHALATION
Qty: 30 | Refills: 0 | OUTPATIENT
Start: 2017-12-15

## 2017-12-15 RX ORDER — SIMVASTATIN 20 MG/1
1 TABLET, FILM COATED ORAL
Qty: 30 | Refills: 0 | OUTPATIENT
Start: 2017-12-15 | End: 2018-01-13

## 2017-12-15 RX ORDER — MORPHINE SULFATE 50 MG/1
15 CAPSULE, EXTENDED RELEASE ORAL EVERY 4 HOURS
Qty: 0 | Refills: 0 | Status: DISCONTINUED | OUTPATIENT
Start: 2017-12-15 | End: 2017-12-17

## 2017-12-15 RX ORDER — SENNA PLUS 8.6 MG/1
2 TABLET ORAL
Qty: 60 | Refills: 0 | OUTPATIENT
Start: 2017-12-15 | End: 2018-01-13

## 2017-12-15 RX ORDER — MORPHINE SULFATE 50 MG/1
20 CAPSULE, EXTENDED RELEASE ORAL EVERY 4 HOURS
Qty: 0 | Refills: 0 | Status: DISCONTINUED | OUTPATIENT
Start: 2017-12-15 | End: 2017-12-17

## 2017-12-15 RX ORDER — TAMSULOSIN HYDROCHLORIDE 0.4 MG/1
1 CAPSULE ORAL
Qty: 30 | Refills: 0 | OUTPATIENT
Start: 2017-12-15 | End: 2018-01-13

## 2017-12-15 RX ORDER — MORPHINE SULFATE 50 MG/1
10 CAPSULE, EXTENDED RELEASE ORAL EVERY 6 HOURS
Qty: 0 | Refills: 0 | Status: DISCONTINUED | OUTPATIENT
Start: 2017-12-15 | End: 2017-12-17

## 2017-12-15 RX ORDER — ASPIRIN/CALCIUM CARB/MAGNESIUM 324 MG
1 TABLET ORAL
Qty: 30 | Refills: 0 | OUTPATIENT
Start: 2017-12-15 | End: 2018-01-13

## 2017-12-15 RX ORDER — MORPHINE SULFATE 50 MG/1
0.25 CAPSULE, EXTENDED RELEASE ORAL
Qty: 100 | Refills: 0 | OUTPATIENT
Start: 2017-12-15

## 2017-12-15 RX ORDER — PANTOPRAZOLE SODIUM 20 MG/1
1 TABLET, DELAYED RELEASE ORAL
Qty: 30 | Refills: 0 | OUTPATIENT
Start: 2017-12-15 | End: 2018-01-13

## 2017-12-15 RX ADMIN — SIMVASTATIN 40 MILLIGRAM(S): 20 TABLET, FILM COATED ORAL at 23:38

## 2017-12-15 RX ADMIN — MORPHINE SULFATE 4 MILLIGRAM(S): 50 CAPSULE, EXTENDED RELEASE ORAL at 13:55

## 2017-12-15 RX ADMIN — Medication 3 MILLILITER(S): at 20:31

## 2017-12-15 RX ADMIN — PANTOPRAZOLE SODIUM 40 MILLIGRAM(S): 20 TABLET, DELAYED RELEASE ORAL at 07:03

## 2017-12-15 RX ADMIN — Medication 1 TABLET(S): at 07:03

## 2017-12-15 RX ADMIN — MORPHINE SULFATE 15 MILLIGRAM(S): 50 CAPSULE, EXTENDED RELEASE ORAL at 00:00

## 2017-12-15 RX ADMIN — SENNA PLUS 2 TABLET(S): 8.6 TABLET ORAL at 23:38

## 2017-12-15 RX ADMIN — MORPHINE SULFATE 10 MILLIGRAM(S): 50 CAPSULE, EXTENDED RELEASE ORAL at 23:37

## 2017-12-15 RX ADMIN — MORPHINE SULFATE 15 MILLIGRAM(S): 50 CAPSULE, EXTENDED RELEASE ORAL at 07:03

## 2017-12-15 RX ADMIN — Medication 3 MILLILITER(S): at 02:55

## 2017-12-15 RX ADMIN — MORPHINE SULFATE 2 MILLIGRAM(S): 50 CAPSULE, EXTENDED RELEASE ORAL at 07:55

## 2017-12-15 RX ADMIN — MORPHINE SULFATE 10 MILLIGRAM(S): 50 CAPSULE, EXTENDED RELEASE ORAL at 18:48

## 2017-12-15 RX ADMIN — MORPHINE SULFATE 10 MILLIGRAM(S): 50 CAPSULE, EXTENDED RELEASE ORAL at 13:51

## 2017-12-15 RX ADMIN — MORPHINE SULFATE 15 MILLIGRAM(S): 50 CAPSULE, EXTENDED RELEASE ORAL at 16:07

## 2017-12-15 RX ADMIN — Medication 81 MILLIGRAM(S): at 13:41

## 2017-12-15 RX ADMIN — Medication 40 MILLIGRAM(S): at 07:03

## 2017-12-15 RX ADMIN — SERTRALINE 50 MILLIGRAM(S): 25 TABLET, FILM COATED ORAL at 13:41

## 2017-12-15 RX ADMIN — MORPHINE SULFATE 15 MILLIGRAM(S): 50 CAPSULE, EXTENDED RELEASE ORAL at 08:04

## 2017-12-15 RX ADMIN — MORPHINE SULFATE 2 MILLIGRAM(S): 50 CAPSULE, EXTENDED RELEASE ORAL at 00:00

## 2017-12-15 RX ADMIN — MORPHINE SULFATE 15 MILLIGRAM(S): 50 CAPSULE, EXTENDED RELEASE ORAL at 15:03

## 2017-12-15 RX ADMIN — MORPHINE SULFATE 10 MILLIGRAM(S): 50 CAPSULE, EXTENDED RELEASE ORAL at 19:30

## 2017-12-15 RX ADMIN — MORPHINE SULFATE 10 MILLIGRAM(S): 50 CAPSULE, EXTENDED RELEASE ORAL at 14:41

## 2017-12-15 RX ADMIN — MORPHINE SULFATE 4 MILLIGRAM(S): 50 CAPSULE, EXTENDED RELEASE ORAL at 13:40

## 2017-12-15 RX ADMIN — Medication 3 MILLILITER(S): at 09:15

## 2017-12-15 RX ADMIN — MORPHINE SULFATE 2 MILLIGRAM(S): 50 CAPSULE, EXTENDED RELEASE ORAL at 07:03

## 2017-12-15 RX ADMIN — MORPHINE SULFATE 15 MILLIGRAM(S): 50 CAPSULE, EXTENDED RELEASE ORAL at 23:37

## 2017-12-15 RX ADMIN — Medication 1 TABLET(S): at 18:48

## 2017-12-15 RX ADMIN — TAMSULOSIN HYDROCHLORIDE 0.4 MILLIGRAM(S): 0.4 CAPSULE ORAL at 23:39

## 2017-12-15 RX ADMIN — Medication 60 MILLIGRAM(S): at 13:40

## 2017-12-15 RX ADMIN — Medication 3 MILLILITER(S): at 15:01

## 2017-12-15 NOTE — DISCHARGE NOTE ADULT - SECONDARY DIAGNOSIS.
Pulmonary fibrosis BPH (benign prostatic hyperplasia) Essential hypertension Hypercholesteremia Depression

## 2017-12-15 NOTE — DISCHARGE NOTE ADULT - MEDICATION SUMMARY - MEDICATIONS TO TAKE
I will START or STAY ON the medications listed below when I get home from the hospital:    predniSONE 20 mg oral tablet  -- Day 1-3: Take 6 tabs PO qd  Day 4-6: Take 4 tabs PO qd  Day 7-9: Take 2 tabs PO qd  Day 10-12: Take 1 tab PO qd  -- Indication: For Pulmonary fibrosis    aspirin 81 mg oral delayed release tablet  -- 1 tab(s) by mouth once a day  -- Indication: For CAD    morphine 20 mg/mL oral concentrate  -- 0.25 milliliter(s) by mouth every 4 hours MDD:1.5ml per day  -- Indication: For Pulmonary fibrosis    Flomax 0.4 mg oral capsule  -- 1 cap(s) by mouth once a day (at bedtime)  -- Indication: For BPH (benign prostatic hyperplasia)    sertraline 50 mg oral tablet  -- 1 tab(s) by mouth once a day  -- Indication: For Pulmonary fibrosis    simvastatin 40 mg oral tablet  -- 1 tab(s) by mouth once a day (at bedtime)  -- Indication: For HLD    DuoNeb 0.5 mg-2.5 mg/3 mL inhalation solution  -- 3 milliliter(s) inhaled every 6 hours  -- Indication: For Pulmonary fibrosis    senna oral tablet  -- 2 tab(s) by mouth once a day (at bedtime)  -- Indication: For Constipation    pantoprazole 40 mg oral delayed release tablet  -- 1 tab(s) by mouth once a day (before a meal)  -- Indication: For GERD    sulfamethoxazole-trimethoprim 800 mg-160 mg oral tablet  -- 1 tab(s) by mouth 2 times a day  -- Indication: For Pulmonary fibrosis

## 2017-12-15 NOTE — PROGRESS NOTE ADULT - ATTENDING COMMENTS
Thank you for the opportunity to assist with the care of this patient.   [facility] [phone].
Thank you for the opportunity to assist with the care of this patient.   [facility] [phone].
D/w granddaughter. Pt with endstage pulmonary fibrosis. Per palliative, pt to be transitioned back to hospice care.

## 2017-12-15 NOTE — PROGRESS NOTE ADULT - SUBJECTIVE AND OBJECTIVE BOX
OVERNIGHT EVENTS: doing better, on nasal cannula     Present Symptoms:     Dyspnea: 0-1   Nausea/Vomiting: No  Anxiety:  No  Depression: No  Fatigue: Yes   Loss of appetite: No    Pain: none             Character-            Duration-            Effect-            Factors-            Frequency-            Location-            Severity-    Review of Systems: Reviewed                   negative - no chest pain   Unable to obtain due to poor mentation   All others negative    MEDICATIONS  (STANDING):  ALBUTerol/ipratropium for Nebulization 3 milliLiter(s) Nebulizer every 6 hours  aspirin enteric coated 81 milliGRAM(s) Oral daily  influenza   Vaccine 0.5 milliLiter(s) IntraMuscular once  morphine  - Injectable 2 milliGRAM(s) IV Push every 6 hours  morphine ER Tablet 15 milliGRAM(s) Oral every 8 hours  pantoprazole    Tablet 40 milliGRAM(s) Oral before breakfast  predniSONE   Tablet 60 milliGRAM(s) Oral daily  senna 2 Tablet(s) Oral at bedtime  sertraline 50 milliGRAM(s) Oral daily  simvastatin 40 milliGRAM(s) Oral at bedtime  tamsulosin 0.4 milliGRAM(s) Oral at bedtime  trimethoprim  160 mG/sulfamethoxazole 800 mG 1 Tablet(s) Oral two times a day    MEDICATIONS  (PRN):  morphine  - Injectable 4 milliGRAM(s) IV Push every 2 hours PRN shortness of breath/dyspnea    PHYSICAL EXAM:    Vital Signs Last 24 Hrs  T(C): 36.6 (15 Dec 2017 09:31), Max: 37.1 (15 Dec 2017 05:22)  T(F): 97.8 (15 Dec 2017 09:31), Max: 98.8 (15 Dec 2017 05:22)  HR: 84 (15 Dec 2017 09:31) (64 - 84)  BP: 148/71 (15 Dec 2017 09:31) (132/64 - 148/71)  BP(mean): --  RR: 16 (15 Dec 2017 09:31) (16 - 18)  SpO2: 97% (15 Dec 2017 09:31) (88% - 98%)    General: alert  oriented    Karnofsky:  30-40 %    HEENT: normal      Lungs: comfortable     CV: normal      GI: normal      : normal      MSK: weakness      Skin: no rash    LABS:                      12.8   11.2  )-----------( 231      ( 14 Dec 2017 06:21 )             39.5     12-14    140  |  101  |  20.0  ----------------------------<  134<H>  4.4   |  25.0  |  0.77    Ca    9.5      14 Dec 2017 06:21  Phos  3.6     12-14  Mg     2.2     12-14    I&O's Summary    14 Dec 2017 07:01  -  15 Dec 2017 07:00  --------------------------------------------------------  IN: 120 mL / OUT: 0 mL / NET: 120 mL    RADIOLOGY & ADDITIONAL STUDIES:    ADVANCE DIRECTIVES: DNR/I

## 2017-12-15 NOTE — DISCHARGE NOTE ADULT - PLAN OF CARE
Therapeutic optimization Resolved. Continue with prednisone taper. Follow up with hospice care. Continue with flomax stable continue with zocor. continue with sertraline

## 2017-12-15 NOTE — PROGRESS NOTE ADULT - ASSESSMENT
87 yo M with h/o idiopathic pulmonary fibrosis (previously on home hospice) admitted to [facility] with respiratory distress. In ED pt placed on BiPAP. MICU eval notes poor prognosis as pt has been deteriorating significantly over the year. CXR showed diffuse b/l reticular nodules. CT chest showed negative for pulmonary emboli, severe chronic interstitial lung disease, likely IPF. Palliative care had conversation with family in which DNR/DNI and MOLST form.

## 2017-12-15 NOTE — DISCHARGE NOTE ADULT - CARE PLAN
Principal Discharge DX:	Acute on chronic respiratory failure with hypoxia  Goal:	Therapeutic optimization  Instructions for follow-up, activity and diet:	Resolved.  Secondary Diagnosis:	Pulmonary fibrosis  Instructions for follow-up, activity and diet:	Continue with prednisone taper. Follow up with hospice care.  Secondary Diagnosis:	BPH (benign prostatic hyperplasia)  Instructions for follow-up, activity and diet:	Continue with flomax  Secondary Diagnosis:	Essential hypertension  Instructions for follow-up, activity and diet:	stable  Secondary Diagnosis:	Hypercholesteremia  Instructions for follow-up, activity and diet:	continue with zocor.  Secondary Diagnosis:	Depression  Instructions for follow-up, activity and diet:	continue with sertraline

## 2017-12-15 NOTE — PROGRESS NOTE ADULT - ASSESSMENT
-IPF/UIP. Advanced dz.  -Acute on chronic hypoxia; was requiring HF O2 but now down to NC  -SOB    Prognosis poor. End stage disease. Pt was on Hospice previously and has simeon readmitted to Hospice per team    RECC:  Palliative mgmt with O2, steroids. MSO4 can be used prn. Plan for d/c to home Hospice. D/W Padmaja, Hospice nurse, and Dr Westbrook. Will see prn.

## 2017-12-15 NOTE — PROGRESS NOTE ADULT - SUBJECTIVE AND OBJECTIVE BOX
CHRISTINA GAL    [id number]    86y      Male    INTERVAL HPI/OVERNIGHT EVENTS: No events on. Now on nasal cannula.    Hospital course:  85 yo M with h/o idiopathic pulmonary fibrosis (previously on home hospice) admitted to [facility] with respiratory distress. In ED pt placed on BiPAP. MICU eval notes poor prognosis as pt has been deteriorating significantly over the year. CXR showed diffuse b/l reticular nodules. CT chest showed negative for pulmonary emboli, severe chronic interstitial lung disease, likely IPF. Palliative care had conversation with family in which DNR/DNI and MOLST form.       REVIEW OF SYSTEMS:    CONSTITUTIONAL: No fever   CARDIOVASCULAR: No chest pain     Vital Signs Last 24 Hrs  T(C): 36.6 (15 Dec 2017 09:31), Max: 37.1 (15 Dec 2017 05:22)  T(F): 97.8 (15 Dec 2017 09:31), Max: 98.8 (15 Dec 2017 05:22)  HR: 84 (15 Dec 2017 09:31) (64 - 84)  BP: 148/71 (15 Dec 2017 09:31) (132/64 - 148/71)  BP(mean): --  RR: 16 (15 Dec 2017 09:31) (16 - 18)  SpO2: 97% (15 Dec 2017 09:31) (88% - 98%)    PHYSICAL EXAM:    GENERAL: NAD  HEENT: PERRL, +EOMI  CHEST/LUNG: diffuse fine crackles  CARDIO: Regular rate and rhythm   ABDOMEN: Soft, Nontender, Nondistended; Bowel sounds present       LABS:                        12.8   11.2  )-----------( 231      ( 14 Dec 2017 06:21 )             39.5     12-14    140  |  101  |  20.0  ----------------------------<  134<H>  4.4   |  25.0  |  0.77    Ca    9.5      14 Dec 2017 06:21  Phos  3.6     12-14  Mg     2.2     12-14              MEDICATIONS  (STANDING):  ALBUTerol/ipratropium for Nebulization 3 milliLiter(s) Nebulizer every 6 hours  aspirin enteric coated 81 milliGRAM(s) Oral daily  influenza   Vaccine 0.5 milliLiter(s) IntraMuscular once  morphine Concentrate 10 milliGRAM(s) SubLingual every 6 hours  morphine ER Tablet 15 milliGRAM(s) Oral every 8 hours  pantoprazole    Tablet 40 milliGRAM(s) Oral before breakfast  predniSONE   Tablet 60 milliGRAM(s) Oral daily  senna 2 Tablet(s) Oral at bedtime  sertraline 50 milliGRAM(s) Oral daily  simvastatin 40 milliGRAM(s) Oral at bedtime  tamsulosin 0.4 milliGRAM(s) Oral at bedtime  trimethoprim  160 mG/sulfamethoxazole 800 mG 1 Tablet(s) Oral two times a day    MEDICATIONS  (PRN):  morphine  - Injectable 4 milliGRAM(s) IV Push every 2 hours PRN shortness of breath/dyspnea  morphine Concentrate 20 milliGRAM(s) SubLingual every 4 hours PRN severe dyspnea  morphine Concentrate 15 milliGRAM(s) SubLingual every 4 hours PRN mild to moderate dyspnea      RADIOLOGY & ADDITIONAL TESTS:

## 2017-12-15 NOTE — PROGRESS NOTE ADULT - ASSESSMENT
86M with end stage lung disease with acute on chronic respiratory distress now back on nasal cannula and doing better.

## 2017-12-15 NOTE — DISCHARGE NOTE ADULT - CARE PROVIDER_API CALL
Surinder Bentley), Internal Medicine; Pulmonary Disease  [place] [address]  Phone: [phone]  Fax: [phone]

## 2017-12-15 NOTE — DISCHARGE NOTE ADULT - HOSPITAL COURSE
85 yo M with h/o idiopathic pulmonary fibrosis (previously on home hospice) admitted to [facility] with respiratory distress. In ED pt placed on BiPAP. MICU eval notes poor prognosis as pt has been deteriorating significantly over the year. CXR showed diffuse b/l reticular nodules. CT chest showed negative for pulmonary emboli, severe chronic interstitial lung disease, likely IPF. Palliative care had conversation with family in which DNR/DNI and MOLST form. Pt improved on steroids and was weaned off of high flow. Transitioned to hospice care.     Time to discharge: >35 minutes spent coordinating care

## 2017-12-15 NOTE — PROGRESS NOTE ADULT - SUBJECTIVE AND OBJECTIVE BOX
PULMONARY PROGRESS NOTE      EDD SANTO-[id number]    Patient is a 86y old  Male who presents with a chief complaint of shortness of breath (12 Dec 2017 12:47)      INTERVAL HPI/OVERNIGHT EVENTS: Back down to NCO[employer]; 5 LPM. Per RN O2 sat 90-92%. NAD.    MEDICATIONS  (STANDING):  ALBUTerol/ipratropium for Nebulization 3 milliLiter(s) Nebulizer every 6 hours  aspirin enteric coated 81 milliGRAM(s) Oral daily  influenza   Vaccine 0.5 milliLiter(s) IntraMuscular once  methylPREDNISolone sodium succinate Injectable 40 milliGRAM(s) IV Push every 12 hours  morphine  - Injectable 2 milliGRAM(s) IV Push every 6 hours  morphine ER Tablet 15 milliGRAM(s) Oral every 8 hours  pantoprazole    Tablet 40 milliGRAM(s) Oral before breakfast  senna 2 Tablet(s) Oral at bedtime  sertraline 50 milliGRAM(s) Oral daily  simvastatin 40 milliGRAM(s) Oral at bedtime  tamsulosin 0.4 milliGRAM(s) Oral at bedtime  trimethoprim  160 mG/sulfamethoxazole 800 mG 1 Tablet(s) Oral two times a day      MEDICATIONS  (PRN):  morphine  - Injectable 4 milliGRAM(s) IV Push every 2 hours PRN shortness of breath/dyspnea      Allergies    No Known Allergies    Intolerances        PAST MEDICAL & SURGICAL HISTORY:  Depression  BPH (benign prostatic hyperplasia)  Osteoarthritis  Pulmonary fibrosis  HTN (hypertension)  Hypercholesteremia  No significant past surgical history       Vital Signs Last 24 Hrs  T(C): 36.6 (15 Dec 2017 09:31), Max: 37.1 (15 Dec 2017 05:22)  T(F): 97.8 (15 Dec 2017 09:31), Max: 98.8 (15 Dec 2017 05:22)  HR: 84 (15 Dec 2017 09:31) (64 - 84)  BP: 148/71 (15 Dec 2017 09:31) (132/64 - 148/71)  BP(mean): --  RR: 16 (15 Dec 2017 09:31) (16 - 18)  SpO2: 97% (15 Dec 2017 09:31) (88% - 98%)    PHYSICAL EXAMINATION:    GENERAL: The patient is awake and alert in no apparent distress.     HEENT: Head is normocephalic and atraumatic. Extraocular muscles are intact. Mucous membranes are moist.    NECK: Supple.    LUNGS: crackles b/l, worse at bases, respirations unlabored    HEART: Regular rate and rhythm      ABDOMEN: Soft, nontender, and nondistended.      EXTREMITIES: Without any cyanosis, clubbing, rash, lesions     NEUROLOGIC: Grossly intact.

## 2017-12-15 NOTE — DISCHARGE NOTE ADULT - PATIENT PORTAL LINK FT
“You can access the [employer] Patient Portal, offered by [facility], by registering with the following website: [email or web address]”

## 2017-12-15 NOTE — GOALS OF CARE CONVERSATION - PERSONAL ADVANCE DIRECTIVE - CONVERSATION DETAILS
pt  is a home hospice pt  family wants to remain inpt  hospice at  [facility] they believe transfer to the [facility] to be a hardship for family . discussed with  dr middleton  family is agreeable to take pt home jostin medically ready md does feel needs a few more days . hospice will remain available
Discharge planned for 12/16/17. New 10L concentrator and nebulizer ordered for delivery on 12/15/16 am. Transportation arranged for 2pm.  Lashawn and Dr Westbrook made aware of the above. Any questions please call [phone]

## 2017-12-16 PROCEDURE — 99232 SBSQ HOSP IP/OBS MODERATE 35: CPT

## 2017-12-16 RX ADMIN — MORPHINE SULFATE 15 MILLIGRAM(S): 50 CAPSULE, EXTENDED RELEASE ORAL at 00:30

## 2017-12-16 RX ADMIN — Medication 3 MILLILITER(S): at 03:22

## 2017-12-16 RX ADMIN — SENNA PLUS 2 TABLET(S): 8.6 TABLET ORAL at 22:01

## 2017-12-16 RX ADMIN — MORPHINE SULFATE 15 MILLIGRAM(S): 50 CAPSULE, EXTENDED RELEASE ORAL at 17:47

## 2017-12-16 RX ADMIN — PANTOPRAZOLE SODIUM 40 MILLIGRAM(S): 20 TABLET, DELAYED RELEASE ORAL at 06:11

## 2017-12-16 RX ADMIN — TAMSULOSIN HYDROCHLORIDE 0.4 MILLIGRAM(S): 0.4 CAPSULE ORAL at 22:01

## 2017-12-16 RX ADMIN — MORPHINE SULFATE 10 MILLIGRAM(S): 50 CAPSULE, EXTENDED RELEASE ORAL at 06:11

## 2017-12-16 RX ADMIN — MORPHINE SULFATE 15 MILLIGRAM(S): 50 CAPSULE, EXTENDED RELEASE ORAL at 04:17

## 2017-12-16 RX ADMIN — MORPHINE SULFATE 10 MILLIGRAM(S): 50 CAPSULE, EXTENDED RELEASE ORAL at 13:30

## 2017-12-16 RX ADMIN — MORPHINE SULFATE 10 MILLIGRAM(S): 50 CAPSULE, EXTENDED RELEASE ORAL at 07:00

## 2017-12-16 RX ADMIN — MORPHINE SULFATE 10 MILLIGRAM(S): 50 CAPSULE, EXTENDED RELEASE ORAL at 19:15

## 2017-12-16 RX ADMIN — MORPHINE SULFATE 15 MILLIGRAM(S): 50 CAPSULE, EXTENDED RELEASE ORAL at 07:34

## 2017-12-16 RX ADMIN — MORPHINE SULFATE 10 MILLIGRAM(S): 50 CAPSULE, EXTENDED RELEASE ORAL at 00:15

## 2017-12-16 RX ADMIN — MORPHINE SULFATE 15 MILLIGRAM(S): 50 CAPSULE, EXTENDED RELEASE ORAL at 23:00

## 2017-12-16 RX ADMIN — MORPHINE SULFATE 15 MILLIGRAM(S): 50 CAPSULE, EXTENDED RELEASE ORAL at 22:01

## 2017-12-16 RX ADMIN — MORPHINE SULFATE 15 MILLIGRAM(S): 50 CAPSULE, EXTENDED RELEASE ORAL at 16:49

## 2017-12-16 RX ADMIN — MORPHINE SULFATE 15 MILLIGRAM(S): 50 CAPSULE, EXTENDED RELEASE ORAL at 06:11

## 2017-12-16 RX ADMIN — Medication 3 MILLILITER(S): at 20:45

## 2017-12-16 RX ADMIN — MORPHINE SULFATE 15 MILLIGRAM(S): 50 CAPSULE, EXTENDED RELEASE ORAL at 22:31

## 2017-12-16 RX ADMIN — MORPHINE SULFATE 10 MILLIGRAM(S): 50 CAPSULE, EXTENDED RELEASE ORAL at 18:42

## 2017-12-16 RX ADMIN — MORPHINE SULFATE 15 MILLIGRAM(S): 50 CAPSULE, EXTENDED RELEASE ORAL at 04:31

## 2017-12-16 RX ADMIN — Medication 1 TABLET(S): at 18:41

## 2017-12-16 RX ADMIN — SERTRALINE 50 MILLIGRAM(S): 25 TABLET, FILM COATED ORAL at 12:50

## 2017-12-16 RX ADMIN — MORPHINE SULFATE 15 MILLIGRAM(S): 50 CAPSULE, EXTENDED RELEASE ORAL at 22:02

## 2017-12-16 RX ADMIN — SIMVASTATIN 40 MILLIGRAM(S): 20 TABLET, FILM COATED ORAL at 22:01

## 2017-12-16 RX ADMIN — Medication 3 MILLILITER(S): at 09:12

## 2017-12-16 RX ADMIN — Medication 60 MILLIGRAM(S): at 06:11

## 2017-12-16 RX ADMIN — Medication 3 MILLILITER(S): at 15:20

## 2017-12-16 RX ADMIN — Medication 81 MILLIGRAM(S): at 12:50

## 2017-12-16 RX ADMIN — MORPHINE SULFATE 10 MILLIGRAM(S): 50 CAPSULE, EXTENDED RELEASE ORAL at 12:50

## 2017-12-16 RX ADMIN — Medication 1 TABLET(S): at 06:10

## 2017-12-16 NOTE — PROGRESS NOTE ADULT - SUBJECTIVE AND OBJECTIVE BOX
CHRISTINA SANTO    [id number]    86y      Male    INTERVAL HPI/OVERNIGHT EVENTS: No events on. Feels well today. Family friend at bedside. Refused .     Hospital course:  85 yo M with h/o idiopathic pulmonary fibrosis (previously on home hospice) admitted to [facility] with respiratory distress. In ED pt placed on BiPAP. MICU eval notes poor prognosis as pt has been deteriorating significantly over the year. CXR showed diffuse b/l reticular nodules. CT chest showed negative for pulmonary emboli, severe chronic interstitial lung disease, likely IPF. Palliative care had conversation with family in which DNR/DNI and MOLST form.       REVIEW OF SYSTEMS:    CONSTITUTIONAL: No fever, weight loss, or fatigue  RESPIRATORY: No cough   CARDIOVASCULAR: No chest pain     Vital Signs Last 24 Hrs  T(C): 36.6 (16 Dec 2017 09:53), Max: 36.7 (15 Dec 2017 22:00)  T(F): 97.8 (16 Dec 2017 09:53), Max: 98 (15 Dec 2017 22:00)  HR: 91 (16 Dec 2017 09:53) (82 - 91)  BP: 138/67 (16 Dec 2017 09:53) (135/65 - 147/75)  BP(mean): --  RR: 18 (16 Dec 2017 09:53) (18 - 18)  SpO2: 94% (16 Dec 2017 09:53) (90% - 94%)    PHYSICAL EXAM:    GENERAL: NAD   HEENT: PERRL, +EOMI  CHEST/LUNG: diffuse fine rales  HEART: S1S2+, Regular rate and rhythm   ABDOMEN: Soft, Nontender, Nondistended; Bowel sounds present       LABS:                  MEDICATIONS  (STANDING):  ALBUTerol/ipratropium for Nebulization 3 milliLiter(s) Nebulizer every 6 hours  aspirin enteric coated 81 milliGRAM(s) Oral daily  influenza   Vaccine 0.5 milliLiter(s) IntraMuscular once  morphine Concentrate 10 milliGRAM(s) SubLingual every 6 hours  morphine ER Tablet 15 milliGRAM(s) Oral every 8 hours  pantoprazole    Tablet 40 milliGRAM(s) Oral before breakfast  predniSONE   Tablet 60 milliGRAM(s) Oral daily  senna 2 Tablet(s) Oral at bedtime  sertraline 50 milliGRAM(s) Oral daily  simvastatin 40 milliGRAM(s) Oral at bedtime  tamsulosin 0.4 milliGRAM(s) Oral at bedtime  trimethoprim  160 mG/sulfamethoxazole 800 mG 1 Tablet(s) Oral two times a day    MEDICATIONS  (PRN):  morphine  - Injectable 4 milliGRAM(s) IV Push every 2 hours PRN shortness of breath/dyspnea  morphine Concentrate 20 milliGRAM(s) SubLingual every 4 hours PRN severe dyspnea  morphine Concentrate 15 milliGRAM(s) SubLingual every 4 hours PRN mild to moderate dyspnea      RADIOLOGY & ADDITIONAL TESTS:

## 2017-12-16 NOTE — PROGRESS NOTE ADULT - ASSESSMENT
87 yo M with h/o idiopathic pulmonary fibrosis (previously on home hospice) admitted to [facility] with respiratory distress. In ED pt placed on BiPAP. MICU eval notes poor prognosis as pt has been deteriorating significantly over the year. CXR showed diffuse b/l reticular nodules. CT chest showed negative for pulmonary emboli, severe chronic interstitial lung disease, likely IPF. Palliative care had conversation with family in which DNR/DNI and MOLST form.

## 2017-12-17 VITALS
DIASTOLIC BLOOD PRESSURE: 85 MMHG | HEART RATE: 84 BPM | OXYGEN SATURATION: 94 % | TEMPERATURE: 97 F | RESPIRATION RATE: 20 BRPM | SYSTOLIC BLOOD PRESSURE: 158 MMHG

## 2017-12-17 PROCEDURE — 80048 BASIC METABOLIC PNL TOTAL CA: CPT

## 2017-12-17 PROCEDURE — 94640 AIRWAY INHALATION TREATMENT: CPT

## 2017-12-17 PROCEDURE — 86703 HIV-1/HIV-2 1 RESULT ANTBDY: CPT

## 2017-12-17 PROCEDURE — 84100 ASSAY OF PHOSPHORUS: CPT

## 2017-12-17 PROCEDURE — 84295 ASSAY OF SERUM SODIUM: CPT

## 2017-12-17 PROCEDURE — 71275 CT ANGIOGRAPHY CHEST: CPT

## 2017-12-17 PROCEDURE — 86038 ANTINUCLEAR ANTIBODIES: CPT

## 2017-12-17 PROCEDURE — 85027 COMPLETE CBC AUTOMATED: CPT

## 2017-12-17 PROCEDURE — 83880 ASSAY OF NATRIURETIC PEPTIDE: CPT

## 2017-12-17 PROCEDURE — 92610 EVALUATE SWALLOWING FUNCTION: CPT

## 2017-12-17 PROCEDURE — 80053 COMPREHEN METABOLIC PANEL: CPT

## 2017-12-17 PROCEDURE — 87633 RESP VIRUS 12-25 TARGETS: CPT

## 2017-12-17 PROCEDURE — 82803 BLOOD GASES ANY COMBINATION: CPT

## 2017-12-17 PROCEDURE — 86256 FLUORESCENT ANTIBODY TITER: CPT

## 2017-12-17 PROCEDURE — 83690 ASSAY OF LIPASE: CPT

## 2017-12-17 PROCEDURE — 85014 HEMATOCRIT: CPT

## 2017-12-17 PROCEDURE — 94760 N-INVAS EAR/PLS OXIMETRY 1: CPT

## 2017-12-17 PROCEDURE — 85652 RBC SED RATE AUTOMATED: CPT

## 2017-12-17 PROCEDURE — 87581 M.PNEUMON DNA AMP PROBE: CPT

## 2017-12-17 PROCEDURE — 83735 ASSAY OF MAGNESIUM: CPT

## 2017-12-17 PROCEDURE — 87486 CHLMYD PNEUM DNA AMP PROBE: CPT

## 2017-12-17 PROCEDURE — T1013: CPT

## 2017-12-17 PROCEDURE — 86036 ANCA SCREEN EACH ANTIBODY: CPT

## 2017-12-17 PROCEDURE — 93005 ELECTROCARDIOGRAM TRACING: CPT

## 2017-12-17 PROCEDURE — 84132 ASSAY OF SERUM POTASSIUM: CPT

## 2017-12-17 PROCEDURE — 83615 LACTATE (LD) (LDH) ENZYME: CPT

## 2017-12-17 PROCEDURE — 87798 DETECT AGENT NOS DNA AMP: CPT

## 2017-12-17 PROCEDURE — 36600 WITHDRAWAL OF ARTERIAL BLOOD: CPT

## 2017-12-17 PROCEDURE — 82947 ASSAY GLUCOSE BLOOD QUANT: CPT

## 2017-12-17 PROCEDURE — 82330 ASSAY OF CALCIUM: CPT

## 2017-12-17 PROCEDURE — 99291 CRITICAL CARE FIRST HOUR: CPT | Mod: 25

## 2017-12-17 PROCEDURE — 82550 ASSAY OF CK (CPK): CPT

## 2017-12-17 PROCEDURE — 94660 CPAP INITIATION&MGMT: CPT

## 2017-12-17 PROCEDURE — 84484 ASSAY OF TROPONIN QUANT: CPT

## 2017-12-17 PROCEDURE — 71045 X-RAY EXAM CHEST 1 VIEW: CPT

## 2017-12-17 PROCEDURE — 83605 ASSAY OF LACTIC ACID: CPT

## 2017-12-17 PROCEDURE — 82435 ASSAY OF BLOOD CHLORIDE: CPT

## 2017-12-17 PROCEDURE — 86431 RHEUMATOID FACTOR QUANT: CPT

## 2017-12-17 PROCEDURE — 99239 HOSP IP/OBS DSCHRG MGMT >30: CPT

## 2017-12-17 PROCEDURE — 36415 COLL VENOUS BLD VENIPUNCTURE: CPT

## 2017-12-17 RX ADMIN — MORPHINE SULFATE 10 MILLIGRAM(S): 50 CAPSULE, EXTENDED RELEASE ORAL at 00:01

## 2017-12-17 RX ADMIN — Medication 3 MILLILITER(S): at 03:35

## 2017-12-17 RX ADMIN — MORPHINE SULFATE 15 MILLIGRAM(S): 50 CAPSULE, EXTENDED RELEASE ORAL at 14:21

## 2017-12-17 RX ADMIN — Medication 81 MILLIGRAM(S): at 11:38

## 2017-12-17 RX ADMIN — MORPHINE SULFATE 15 MILLIGRAM(S): 50 CAPSULE, EXTENDED RELEASE ORAL at 06:50

## 2017-12-17 RX ADMIN — MORPHINE SULFATE 10 MILLIGRAM(S): 50 CAPSULE, EXTENDED RELEASE ORAL at 06:09

## 2017-12-17 RX ADMIN — MORPHINE SULFATE 10 MILLIGRAM(S): 50 CAPSULE, EXTENDED RELEASE ORAL at 00:45

## 2017-12-17 RX ADMIN — MORPHINE SULFATE 10 MILLIGRAM(S): 50 CAPSULE, EXTENDED RELEASE ORAL at 17:01

## 2017-12-17 RX ADMIN — MORPHINE SULFATE 10 MILLIGRAM(S): 50 CAPSULE, EXTENDED RELEASE ORAL at 12:41

## 2017-12-17 RX ADMIN — MORPHINE SULFATE 15 MILLIGRAM(S): 50 CAPSULE, EXTENDED RELEASE ORAL at 06:09

## 2017-12-17 RX ADMIN — Medication 1 TABLET(S): at 06:09

## 2017-12-17 RX ADMIN — Medication 1 TABLET(S): at 17:02

## 2017-12-17 RX ADMIN — SERTRALINE 50 MILLIGRAM(S): 25 TABLET, FILM COATED ORAL at 11:38

## 2017-12-17 RX ADMIN — PANTOPRAZOLE SODIUM 40 MILLIGRAM(S): 20 TABLET, DELAYED RELEASE ORAL at 06:09

## 2017-12-17 RX ADMIN — MORPHINE SULFATE 10 MILLIGRAM(S): 50 CAPSULE, EXTENDED RELEASE ORAL at 11:38

## 2017-12-17 RX ADMIN — MORPHINE SULFATE 10 MILLIGRAM(S): 50 CAPSULE, EXTENDED RELEASE ORAL at 06:49

## 2017-12-17 RX ADMIN — MORPHINE SULFATE 15 MILLIGRAM(S): 50 CAPSULE, EXTENDED RELEASE ORAL at 15:15

## 2017-12-17 RX ADMIN — Medication 60 MILLIGRAM(S): at 06:10

## 2017-12-17 NOTE — PROGRESS NOTE ADULT - PROBLEM SELECTOR PLAN 4
normotensive  monitor BP  will tx as necessary
-recommend making sure he is comfortable with sublingual morphine, and if so recommend discharge back home with hospice services.
c/w statin
normotensive  monitor BP  will tx as necessary
-ask respiratory to switch off high flow to vent or oxi mask, hospice to evaluate for inpatient transition. eventual back home with hospice.

## 2017-12-17 NOTE — PROGRESS NOTE ADULT - PROBLEM SELECTOR PROBLEM 3
BPH (benign prostatic hyperplasia)
Debility
Debility
BPH (benign prostatic hyperplasia)

## 2017-12-17 NOTE — PROGRESS NOTE ADULT - ASSESSMENT
85 yo M with h/o idiopathic pulmonary fibrosis (previously on home hospice) admitted to [facility] with respiratory distress. In ED pt placed on BiPAP. MICU eval notes poor prognosis as pt has been deteriorating significantly over the year. CXR showed diffuse b/l reticular nodules. CT chest showed negative for pulmonary emboli, severe chronic interstitial lung disease, likely IPF. Palliative care had conversation with family in which DNR/DNI and MOLST form.

## 2017-12-17 NOTE — PROGRESS NOTE ADULT - PROBLEM SELECTOR PROBLEM 4
HTN (hypertension)
Hypercholesteremia
Palliative care encounter
Palliative care encounter
HTN (hypertension)

## 2017-12-17 NOTE — PROGRESS NOTE ADULT - PROBLEM SELECTOR PROBLEM 2
Acute on chronic respiratory failure with hypoxia
Pulmonary fibrosis
Acute on chronic respiratory failure with hypoxia
Pulmonary fibrosis

## 2017-12-17 NOTE — PROGRESS NOTE ADULT - PROBLEM SELECTOR PROBLEM 1
Acute on chronic respiratory failure with hypoxia
Pulmonary fibrosis
Pulmonary fibrosis
Acute on chronic respiratory failure with hypoxia

## 2017-12-17 NOTE — PROGRESS NOTE ADULT - PROVIDER SPECIALTY LIST ADULT
Hospitalist
Palliative Care
Pulmonology
Palliative Care
Internal Medicine

## 2017-12-17 NOTE — PROGRESS NOTE ADULT - SUBJECTIVE AND OBJECTIVE BOX
CHRISTINA SANTO    [id number]    86y      Male    INTERVAL HPI/OVERNIGHT EVENTS: No events on. Feels fine.    Hospital course:  87 yo M with h/o idiopathic pulmonary fibrosis (previously on home hospice) admitted to [facility] with respiratory distress. In ED pt placed on BiPAP. MICU eval notes poor prognosis as pt has been deteriorating significantly over the year. CXR showed diffuse b/l reticular nodules. CT chest showed negative for pulmonary emboli, severe chronic interstitial lung disease, likely IPF. Palliative care had conversation with family in which DNR/DNI and MOLST form.     REVIEW OF SYSTEMS:    CONSTITUTIONAL: No fever   CARDIOVASCULAR: No chest pain     Vital Signs Last 24 Hrs  T(C): 36.5 (17 Dec 2017 04:22), Max: 36.6 (16 Dec 2017 09:53)  T(F): 97.7 (17 Dec 2017 04:22), Max: 97.8 (16 Dec 2017 09:53)  HR: 95 (17 Dec 2017 04:22) (80 - 95)  BP: 136/63 (17 Dec 2017 04:22) (136/63 - 149/76)  BP(mean): --  RR: 18 (17 Dec 2017 04:22) (18 - 18)  SpO2: 98% (17 Dec 2017 04:22) (94% - 99%)    PHYSICAL EXAM:    GENERAL: NAD, comfortable  HEENT: PERRL, +EOMI  CHEST/LUNG: diffuse fine rales  HEART: S1S2+, Regular rate and rhythm   ABDOMEN: Soft, Nontender, Nondistended; Bowel sounds present     LABS:                  MEDICATIONS  (STANDING):  aspirin enteric coated 81 milliGRAM(s) Oral daily  influenza   Vaccine 0.5 milliLiter(s) IntraMuscular once  morphine Concentrate 10 milliGRAM(s) SubLingual every 6 hours  morphine ER Tablet 15 milliGRAM(s) Oral every 8 hours  pantoprazole    Tablet 40 milliGRAM(s) Oral before breakfast  predniSONE   Tablet 60 milliGRAM(s) Oral daily  senna 2 Tablet(s) Oral at bedtime  sertraline 50 milliGRAM(s) Oral daily  simvastatin 40 milliGRAM(s) Oral at bedtime  tamsulosin 0.4 milliGRAM(s) Oral at bedtime  trimethoprim  160 mG/sulfamethoxazole 800 mG 1 Tablet(s) Oral two times a day    MEDICATIONS  (PRN):  morphine  - Injectable 4 milliGRAM(s) IV Push every 2 hours PRN shortness of breath/dyspnea  morphine Concentrate 20 milliGRAM(s) SubLingual every 4 hours PRN severe dyspnea  morphine Concentrate 15 milliGRAM(s) SubLingual every 4 hours PRN mild to moderate dyspnea      RADIOLOGY & ADDITIONAL TESTS:

## 2018-01-19 ENCOUNTER — INPATIENT (INPATIENT)
Facility: HOSPITAL | Age: 83
LOS: 0 days | DRG: 208 | End: 2018-01-19
Attending: INTERNAL MEDICINE | Admitting: INTERNAL MEDICINE
Payer: OTHER MISCELLANEOUS

## 2018-01-19 VITALS — OXYGEN SATURATION: 92 % | HEART RATE: 92 BPM

## 2018-01-19 DIAGNOSIS — C34.90 MALIGNANT NEOPLASM OF UNSPECIFIED PART OF UNSPECIFIED BRONCHUS OR LUNG: ICD-10-CM

## 2018-01-19 DIAGNOSIS — I46.9 CARDIAC ARREST, CAUSE UNSPECIFIED: ICD-10-CM

## 2018-01-19 DIAGNOSIS — J96.01 ACUTE RESPIRATORY FAILURE WITH HYPOXIA: ICD-10-CM

## 2018-01-19 DIAGNOSIS — R57.9 SHOCK, UNSPECIFIED: ICD-10-CM

## 2018-01-19 LAB
ALBUMIN SERPL ELPH-MCNC: 2.9 G/DL — LOW (ref 3.3–5.2)
ALP SERPL-CCNC: 131 U/L — HIGH (ref 40–120)
ALT FLD-CCNC: 110 U/L — HIGH
ANION GAP SERPL CALC-SCNC: 26 MMOL/L — HIGH (ref 5–17)
ANISOCYTOSIS BLD QL: SLIGHT — SIGNIFICANT CHANGE UP
APTT BLD: 73.8 SEC — HIGH (ref 27.5–37.4)
AST SERPL-CCNC: 187 U/L — HIGH
BASE EXCESS BLDA CALC-SCNC: -12.5 MMOL/L — LOW (ref -2–2)
BASOPHILS # BLD AUTO: 0.1 K/UL — SIGNIFICANT CHANGE UP (ref 0–0.2)
BASOPHILS NFR BLD AUTO: 1 % — SIGNIFICANT CHANGE UP (ref 0–2)
BILIRUB SERPL-MCNC: 0.4 MG/DL — SIGNIFICANT CHANGE UP (ref 0.4–2)
BLOOD GAS COMMENTS ARTERIAL: SIGNIFICANT CHANGE UP
BUN SERPL-MCNC: 17 MG/DL — SIGNIFICANT CHANGE UP (ref 8–20)
CALCIUM SERPL-MCNC: 9.8 MG/DL — SIGNIFICANT CHANGE UP (ref 8.6–10.2)
CHLORIDE SERPL-SCNC: 93 MMOL/L — LOW (ref 98–107)
CO2 SERPL-SCNC: 16 MMOL/L — LOW (ref 22–29)
CREAT SERPL-MCNC: 1.19 MG/DL — SIGNIFICANT CHANGE UP (ref 0.5–1.3)
EOSINOPHIL # BLD AUTO: 0.1 K/UL — SIGNIFICANT CHANGE UP (ref 0–0.5)
EOSINOPHIL NFR BLD AUTO: 1 % — SIGNIFICANT CHANGE UP (ref 0–6)
GAS PNL BLDA: SIGNIFICANT CHANGE UP
GLUCOSE SERPL-MCNC: 357 MG/DL — HIGH (ref 70–115)
HCO3 BLDA-SCNC: 15 MMOL/L — LOW (ref 20–26)
HCT VFR BLD CALC: 40.6 % — LOW (ref 42–52)
HGB BLD-MCNC: 12.2 G/DL — LOW (ref 14–18)
HOROWITZ INDEX BLDA+IHG-RTO: 100 — SIGNIFICANT CHANGE UP
INR BLD: 1.2 RATIO — HIGH (ref 0.88–1.16)
LYMPHOCYTES # BLD AUTO: 33 % — SIGNIFICANT CHANGE UP (ref 20–55)
LYMPHOCYTES # BLD AUTO: 6.5 K/UL — HIGH (ref 1–4.8)
MCHC RBC-ENTMCNC: 29.5 PG — SIGNIFICANT CHANGE UP (ref 27–31)
MCHC RBC-ENTMCNC: 30 G/DL — LOW (ref 32–36)
MCV RBC AUTO: 98.1 FL — HIGH (ref 80–94)
METAMYELOCYTES # FLD: 2 % — HIGH (ref 0–0)
MONOCYTES # BLD AUTO: 1.1 K/UL — HIGH (ref 0–0.8)
MONOCYTES NFR BLD AUTO: 6 % — SIGNIFICANT CHANGE UP (ref 3–10)
MYELOCYTES NFR BLD: 5 % — HIGH (ref 0–0)
NEUTROPHILS # BLD AUTO: 10.3 K/UL — HIGH (ref 1.8–8)
NEUTROPHILS NFR BLD AUTO: 42 % — SIGNIFICANT CHANGE UP (ref 37–73)
NEUTS BAND # BLD: 6 % — SIGNIFICANT CHANGE UP (ref 0–8)
NRBC # BLD: 1 /100 — HIGH (ref 0–0)
PCO2 BLDA: 74 MMHG — CRITICAL HIGH (ref 35–45)
PH BLDA: 7.02 — CRITICAL LOW (ref 7.35–7.45)
PLAT MORPH BLD: NORMAL — SIGNIFICANT CHANGE UP
PLATELET # BLD AUTO: 230 K/UL — SIGNIFICANT CHANGE UP (ref 150–400)
PO2 BLDA: 80 MMHG — LOW (ref 83–108)
POTASSIUM SERPL-MCNC: 4.6 MMOL/L — SIGNIFICANT CHANGE UP (ref 3.5–5.3)
POTASSIUM SERPL-SCNC: 4.6 MMOL/L — SIGNIFICANT CHANGE UP (ref 3.5–5.3)
PROT SERPL-MCNC: 6.3 G/DL — LOW (ref 6.6–8.7)
PROTHROM AB SERPL-ACNC: 13.3 SEC — HIGH (ref 9.8–12.7)
RBC # BLD: 4.14 M/UL — LOW (ref 4.6–6.2)
RBC # FLD: 16.9 % — HIGH (ref 11–15.6)
RBC BLD AUTO: ABNORMAL
SAO2 % BLDA: 86 % — LOW (ref 95–99)
SODIUM SERPL-SCNC: 135 MMOL/L — SIGNIFICANT CHANGE UP (ref 135–145)
TROPONIN T SERPL-MCNC: 0.11 NG/ML — HIGH (ref 0–0.06)
VARIANT LYMPHS # BLD: 4 % — SIGNIFICANT CHANGE UP (ref 0–6)
WBC # BLD: 20.8 K/UL — HIGH (ref 4.8–10.8)
WBC # FLD AUTO: 20.8 K/UL — HIGH (ref 4.8–10.8)

## 2018-01-19 PROCEDURE — 93010 ELECTROCARDIOGRAM REPORT: CPT

## 2018-01-19 PROCEDURE — 71045 X-RAY EXAM CHEST 1 VIEW: CPT | Mod: 26

## 2018-01-19 PROCEDURE — 99291 CRITICAL CARE FIRST HOUR: CPT | Mod: 25

## 2018-01-19 PROCEDURE — 36556 INSERT NON-TUNNEL CV CATH: CPT

## 2018-01-19 PROCEDURE — 99291 CRITICAL CARE FIRST HOUR: CPT

## 2018-01-19 RX ORDER — SODIUM CHLORIDE 9 MG/ML
1000 INJECTION INTRAMUSCULAR; INTRAVENOUS; SUBCUTANEOUS ONCE
Qty: 0 | Refills: 0 | Status: COMPLETED | OUTPATIENT
Start: 2018-01-19 | End: 2018-01-19

## 2018-01-19 RX ORDER — PROPOFOL 10 MG/ML
20 INJECTION, EMULSION INTRAVENOUS
Qty: 1000 | Refills: 0 | Status: DISCONTINUED | OUTPATIENT
Start: 2018-01-19 | End: 2018-01-19

## 2018-01-19 RX ORDER — IPRATROPIUM/ALBUTEROL SULFATE 18-103MCG
3 AEROSOL WITH ADAPTER (GRAM) INHALATION ONCE
Qty: 0 | Refills: 0 | Status: COMPLETED | OUTPATIENT
Start: 2018-01-19 | End: 2018-01-19

## 2018-01-19 RX ORDER — SODIUM CHLORIDE 9 MG/ML
3 INJECTION INTRAMUSCULAR; INTRAVENOUS; SUBCUTANEOUS ONCE
Qty: 0 | Refills: 0 | Status: COMPLETED | OUTPATIENT
Start: 2018-01-19 | End: 2018-01-19

## 2018-01-19 RX ORDER — SODIUM BICARBONATE 1 MEQ/ML
50 SYRINGE (ML) INTRAVENOUS ONCE
Qty: 0 | Refills: 0 | Status: COMPLETED | OUTPATIENT
Start: 2018-01-19 | End: 2018-01-19

## 2018-01-19 RX ORDER — VANCOMYCIN HCL 1 G
1000 VIAL (EA) INTRAVENOUS ONCE
Qty: 0 | Refills: 0 | Status: DISCONTINUED | OUTPATIENT
Start: 2018-01-19 | End: 2018-01-19

## 2018-01-19 RX ORDER — PROPOFOL 10 MG/ML
5 INJECTION, EMULSION INTRAVENOUS
Qty: 1000 | Refills: 0 | Status: DISCONTINUED | OUTPATIENT
Start: 2018-01-19 | End: 2018-01-19

## 2018-01-19 RX ORDER — PIPERACILLIN AND TAZOBACTAM 4; .5 G/20ML; G/20ML
3.38 INJECTION, POWDER, LYOPHILIZED, FOR SOLUTION INTRAVENOUS ONCE
Qty: 0 | Refills: 0 | Status: DISCONTINUED | OUTPATIENT
Start: 2018-01-19 | End: 2018-01-19

## 2018-01-19 RX ORDER — SODIUM CHLORIDE 9 MG/ML
1000 INJECTION INTRAMUSCULAR; INTRAVENOUS; SUBCUTANEOUS ONCE
Qty: 0 | Refills: 0 | Status: DISCONTINUED | OUTPATIENT
Start: 2018-01-19 | End: 2018-01-19

## 2018-01-19 RX ADMIN — SODIUM CHLORIDE 1000 MILLILITER(S): 9 INJECTION INTRAMUSCULAR; INTRAVENOUS; SUBCUTANEOUS at 12:51

## 2018-01-19 RX ADMIN — Medication 50 MILLIEQUIVALENT(S): at 12:50

## 2018-01-19 RX ADMIN — Medication 3 MILLILITER(S): at 13:48

## 2018-01-19 RX ADMIN — SODIUM CHLORIDE 3 MILLILITER(S): 9 INJECTION INTRAMUSCULAR; INTRAVENOUS; SUBCUTANEOUS at 12:50

## 2018-01-19 RX ADMIN — PROPOFOL 0.03 MICROGRAM(S)/KG/MIN: 10 INJECTION, EMULSION INTRAVENOUS at 12:51

## 2018-01-19 NOTE — DISCHARGE NOTE FOR THE EXPIRED PATIENT - HOSPITAL COURSE
Pt was BIBA from home in cardiac arrest and traci device in place. Initial and recurrent asystole on cardiac monitor. Pt was intubated by EMS prior to hospital arrival #7.0 ETT. Pt also arrived with IO in place to right tibia.  Pt with ROSC at 1043 in ED. ACLS protocols in place. Pressors initiated. Pt was BIBA from home hospice in cardiac arrest and traci device in place. Initial and recurrent asystole on cardiac monitor. Pt was intubated by EMS prior to hospital arrival #7.0 ETT. Pt also arrived with IO in place to right tibia.  Pt with ROSC at 1043 in ED. ACLS protocols in place. Pressors initiated.

## 2018-01-19 NOTE — ED ADULT NURSE REASSESSMENT NOTE - NS ED NURSE REASSESS COMMENT FT1
Patient o2 saturation 82%, patient making involuntary movements, called ICU ERIC Curiel and made aware, diprivan increased to 20mcgs infusion, Dr Strauss brought to bedside and respiratory therapist, manually bagging patient at this time

## 2018-01-19 NOTE — H&P ADULT - ATTENDING COMMENTS
Pt admitted by [facility] DAREN REYES have seen and evaluated this patient independently and agree with the above findings except as follows: Pt is terminally ill with end stage lung cancer and now prolonged cardiac arrest with profound shock and signs of anoxic myoclonus. Family understands that this is an end of life situation and wish to continue current care with elective withdrawal of treatment when more family arrives. Pt is DNR/I and had been receiving hospice care at home.

## 2018-01-19 NOTE — H&P ADULT - ASSESSMENT
87 y/o M with a h/o HTN, HLD, depression, pulmonary fibrosis, stage 4 lung CA (on 10L home O2), from home hospice, with acute hypoxic respiratory failure, cardiopulmonary arrest, shock.    Extensive discussion held in ED with patient's family regarding advanced directives and medical management. Patient's family (including but not limited to patient's daughter- only child, and granddaughter- HCP) expressed that he would not have wanted intubation or CPR. Made DNR/DNI. Family desires MICU team to continue current medical care with no escalation, emphasis on comfort. Awaiting additional family members to arrive and then will elect to terminally extubate.         Total critical care time spent: 150 mins

## 2018-01-19 NOTE — ED ADULT NURSE REASSESSMENT NOTE - NS ED NURSE REASSESS COMMENT FT1
Dr FONTENOT spoke with Dr Chaudhari re: cath lab ? due to DX and downtime not a candidate  pt with 7.0 OETT  moved to 19 lip.   EPI gtt infusing LEVO gtt infusing   NS infusing via IO   vent a/c 14 75% 450 TV   sats 89%  suctioned for bloody

## 2018-01-19 NOTE — ED ADULT NURSE REASSESSMENT NOTE - NS ED NURSE REASSESS COMMENT FT1
pt with ?seizure like activity with eyes/mouth  fluttering like for a second   pupils 3 non reacting.  MDA    called MICU for pre report   will call back

## 2018-01-19 NOTE — ED CLERICAL - NS ED CLERK UNITS
[facility]

## 2018-01-19 NOTE — ED ADULT NURSE REASSESSMENT NOTE - NS ED NURSE REASSESS COMMENT FT1
levo increased  1mcg   epi remains 0.5 mcg  propofol remains 12mcg  resp at bedside, suctioned for scant,  +placement central line,  drips moved to central line.   resp treatment given    by Karen STEVENS

## 2018-01-19 NOTE — H&P ADULT - PROBLEM SELECTOR PLAN 1
- will continue mechanical ventilation for now  - would ideally like to maintain SaO2 > 88%  - requiring very high vent settings, will continue to titrate as possible  - sedation with propofol  - family has decided to electively extubate after additional members arrive and say their goodbyes

## 2018-01-19 NOTE — ED PROVIDER NOTE - CRITICAL CARE PROVIDED
telephone consultation with the patient's family/direct patient care (not related to procedure)/documentation/interpretation of diagnostic studies/consultation with other physicians/consult w/ pt's family directly relating to pts condition/additional history taking

## 2018-01-19 NOTE — H&P ADULT - PROBLEM SELECTOR PLAN 3
- likely related to cardiac arrest  - will continue vasopressor therapy for now, but will no longer titrate upwards  - family will withdraw medical management after additional family arrives

## 2018-01-19 NOTE — ED ADULT NURSE REASSESSMENT NOTE - NS ED NURSE REASSESS COMMENT FT1
ems arrived at 1010 to pts house, as per wife pt was seen earlier (10min prior to event)  wife went to go get medications ret'd to find pt unresponsive    EMS called.  pt has HX of stage 4 CA  unknown location per EMS  'wife on her way" pt arrived with 7.0 ETT 21 @lip.    EMS administered 4 EPIs and was shocked x1 for VFIB  then  asystole

## 2018-01-19 NOTE — ED ADULT NURSE REASSESSMENT NOTE - NS ED NURSE REASSESS COMMENT FT1
TLC attempted to right groin, unsuccessful threading of  left groin, unsuccessful threading.   attempting right IJ TLC...pt medicated ativan (2mg) and fentanyl ( 50mcg)    as per order family at bedside at times reassurance prov     pt intubated  a/c14 100% 450 peep8  sats 88-89  oral GT brown liquid,  suctioned for scant bloody...EPI gtt infusing 05  LEVO infusing .5  NS infusing wide open

## 2018-01-19 NOTE — H&P ADULT - PROBLEM SELECTOR PLAN 2
- related to hypoxia  - will not perform CPR if another event occurs  - continue closely monitoring hemodynamic status  - titrating propofol to suppress what appears to be seizure activity, ?anoxia

## 2018-01-19 NOTE — ED ADULT NURSE REASSESSMENT NOTE - NS ED NURSE REASSESS COMMENT FT1
ICU PA at bedside for eval.   speaking with family    cm nst 11teens   sats 88-90%  epi gtt continues .5  levo at .5 continues  NS infusing  propofol at 5 infusing via pump    xrays again at bedside   [place] to sbd  yellow returns     waiting dispo....

## 2018-01-19 NOTE — H&P ADULT - MENTAL STATUS
A&Ox0, unresponsive to deep noxious stimuli, no corneal reflexes, not overbreathing vent, periodic non-purposeful eye opening/facial twitching

## 2018-01-19 NOTE — DISCHARGE NOTE FOR THE EXPIRED PATIENT - OTHER SIGNIFICANT FINDINGS
Family remained at bedside throughout hospital course. Dr. Morrison at bedside to explain pt's plan of care. DNR obtained, family agreeable to comfort measures. Pt became bradycardiac at 1330, Dr. Morrison and ICU PA Moisés called to bedside. Pt time of death 1339. Family remains at bedside. Emotional support provided. Spoke with Ike from [employer] case # [id number]. States that pt is not a candidate organ donation secondary to age. Family remained at bedside throughout hospital course. Dr. Morrison at bedside to explain pt's plan of care. DNR/DNI obtained by ICU PA, family agreeable to no escalation of medical management, comfort measures PRN, and plan for terminal extubation when additional family members arrive. Pt became bradycardiac and hypotensive at 1330, vasopressors titrated up by ED physician at request of family as they wished to keep him alive a while longer so other family members could get to [facility]. Patient ultimately did not respond to increased vasopressor dose. Pt time of death 1339. Family remains at bedside. Emotional support provided. Spoke with Ike from [employer] case # [id number]. States that pt is not a candidate organ donation secondary to age.

## 2018-01-19 NOTE — ED ADULT TRIAGE NOTE - CHIEF COMPLAINT QUOTE
Brought in by ambulance, patient arrives in cardiac arrest, ALS protocol in place, no family present on arrival

## 2018-01-19 NOTE — H&P ADULT - PROBLEM SELECTOR PLAN 4
- mechanical ventilation  - pain control PRN  - will liberally treat signs of distress with morphine and ativan

## 2018-01-19 NOTE — ED ADULT NURSE REASSESSMENT NOTE - NS ED NURSE REASSESS COMMENT FT1
as per  Moisés REYES ICU   pt is now DNR/DNI  will withdraw care when out of state family arrives      pt to transfer to MICU

## 2018-01-19 NOTE — ED ADULT NURSE REASSESSMENT NOTE - NS ED NURSE REASSESS COMMENT FT1
Family at bedside, as per Dr Strauss titrate epinephrine infusion to 2 mcgs/kgs/min,  ICU PA Moisés at bedside speaking to family in regards to plan of care

## 2018-01-19 NOTE — ED PROVIDER NOTE - OBJECTIVE STATEMENT
85 y/o M pt with stage 4 cancer arrived by EMS. As per EMS, wife went to get medication for pt and returned to home to find pt unresponsive. EMS were then called and pt arrived in cardiac arrest. As per family, pt is in hospice care. HPI limited due to pt's unresponsive state. 87 y/o M pt with stage 4 cancer arrived by EMS in cardiac arrest. As per EMS, wife went to get medication for pt and returned to home to find pt unresponsive. EMS were then called and pt arrived in cardiac arrest. As per family, pt is in hospice care. HPI limited due to pt's unresponsive state. 85 y/o M pt with stage 4 cancer arrived by EMS in cardiac arrest. As per EMS, wife went to get medication for pt and returned to home to find pt unresponsive. EMS were then called and pt arrived in cardiac arrest. As per family, pt is in hospice care. HPI limited due to pt's unresponsive state.  Pt was intubated in the field. Was found in asystole given 4 epi then went to v fib then pea. IO obtained by EMS.

## 2018-01-19 NOTE — ED ADULT NURSE REASSESSMENT NOTE - NS ED NURSE REASSESS COMMENT FT1
Patient heartrate down into the 30s, BP 70/30, diprivan held at this time, ICU ERIC Curiel made aware and coming to bedside, Dr Strauss at bedside speaking to family

## 2018-01-19 NOTE — H&P ADULT - HISTORY OF PRESENT ILLNESS
87 y/o M with a h/o HTN, HLD, depression, pulmonary fibrosis, stage 4 lung CA (on 10L home O2), from home hospice, presents to ED via EMS after hypoxemic respiratory arrest and subsequent cardiac arrest. Patient intubated in field and CPR began and continued in ED, where ROSC was achieved after ~ 1 hour of total resuscitation efforts. Rhythm was primarily asystole with one run of VT, as per report. Questionable ST-elevations, interventional cardiologist contacted by ED physician who reports that no intervention was offered.      Patient seen and examined in ED. On high dose Levophed and epinephrine infusions for BP support. 100% FiO2 and 12 of PEEP with SaO2 in the low-80's. Poor neuro exam, intermittent seizure activity. Extensive discussion held with family members, including patient's only child and granddaughter who is HCP (although no documentation available in hospital). Patient made DNR/DNI, will continue current medical care with no escalation, emphasis on comfort. Awaiting additional family members to arrive and then will elect to terminally extubate.

## 2018-01-19 NOTE — ED PROVIDER NOTE - PROGRESS NOTE DETAILS
ECG with RBBB and ? inferior ST elevation.  Spoke to Dr. Dinh code stemi doc who states pt not candidate to go to cath lab.

## 2018-01-19 NOTE — ED PROVIDER NOTE - RESPIRATORY, MLM
Breath sounds clear and equal bilaterally. Breath sounds clear and equal bilaterally with bag ventilation. pt intubated unresponsive

## 2019-06-21 NOTE — H&P ADULT - SKIN/BREAST
His lyme antibody was positive which means he may have been in contact with tick sometime in the past. I sent in 10 days of doxycycline antibiotic for treat this in case he has it. Should follow up with PCP about this. His rheumatoid factor was postive which may mean he has rheumatoid arthritis. He also has abnormal thyroid functions all of which he should follow up with pcp. Rest of labs were fine.  Can we send lab results to pcp as well
not applicable

## 2021-04-27 NOTE — SWALLOW BEDSIDE ASSESSMENT ADULT - NS ASR SWALLOW FINDINGS DISCUS
Rounds made with officer present. resting quietly in bed and arouses easily to name called. patient positioned for comfort.soiled brief removed. patient cleaned and dry brief placed on patient. call bell in reach. side rails up. and bed in lowest position. Family/Nursing/Patient

## 2022-07-28 NOTE — PROGRESS NOTE ADULT - PROBLEM SELECTOR PLAN 1
CC:  s/p 25GPPV, AFX, right eye for visually significant vitreous opacity 04/20/22     INTERVAL HISTORY -     Excellent VA and IOP, retina attached, happy with surgical results. Stable floaters OS with no flashing lights. Reports being bothered by floaters OS.      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 History of type 2 diabetes and coronary artery disease s/p CABG August 2021.      Has had persistent floaters in both eyes for the past few years that have been bothersome. Left eye appears to be somewhat worse than the right.      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 He is on board for procedure. Pt interested in pursuing surgery in November 2022.    Plan for 25 g Pars plana vitrectomy / possible air fluid exchange/ possible endolaser left eye   60 min surgery  Peribulbar block  risks discussed 1:1000 risk of infection/bleed/loss of eye; 1:100 risk of RD and need for further surgery.Patient aware of prolonged healing after retinal surgery (up to a year after surgery) as well as possibility of air/gas/SO  instillation into eye. Patient agreed to proceed with surgery.      # POM3 s/p 25GPPV, AFX, 
right eye for visually significant vitreous opacity 04/20/22    - Great VA, subjectively much improved, great anatomic outcomes   -Monitor    # Pseudophakia each eye              - CEIOL OU in [place] ~2018 (Dr. Whitney)              - s/p YAG OD 3/23/2022              - s/p YAG OS 3/16/22     #CHRPE left eye    - Baseline pic done 3/16/22; stable compared to photos   - Observe   - Annual exams        # Early Age related macular degeneration left eye    - Weekly [employer] use was discussed and training performed.   - A diet of leafy green vegetables was encouraged.   - Return precautions were given      RTC: patient interested in surgery in Nov. POD1 if interested in surgery; otherwise 1 year    ~~~~~~~~~~~~~~~~~~~~~~~~~~~~~~~~~~  Uli Landeros MD MPH  Vitreoretinal Fellow PGY-5  [facility]        Complete documentation of historical and exam elements from today's encounter can be found in the full encounter summary report (not reduplicated in this progress note).  I personally obtained the chief complaint(s) and history of present illness.  I confirmed and edited as necessary the review of systems, past medical/surgical history, family history, social history, and examination findings as documented by others; and I examined the patient myself.  I personally reviewed the relevant tests, images, and reports as documented above.  I formulated and edited as necessary the assessment and plan and discussed the findings and management plan with the patient and family    Xiao Ann MD  Professor of Ophthalmology.  Retina Service   Department of Ophthalmology and Visual Neurosciences   [facility]  Phone: [phone]   Fax: [phone]     
Secondary to IPF exacerbation  ABG reviewed  C/w nebs   Taper to prednisone today  Pulm consult appreciated
Secondary to IPF exacerbation  ABG reviewed  C/w nebs and steroids  Pulm consult appreciated
Secondary to IPF exacerbation  ABG reviewed  C/w nebs and steroids  Pulm consult appreciated
Secondary to IPF exacerbation  ABG reviewed  C/w nebs and taper solumedrol  Pts symptoms improving  Pulm consult appreciated
guarded prognosis
advanced poor prognosis
Secondary to IPF exacerbation  ABG reviewed  Currently on high flow O2. Will have respiratory attempt to transition/wean off high flow. If unable to wean off high flow O2 come discharge pt to be discharged to inpatient hospice   C/w nebs   Pts symptoms improving  Pulm consult appreciated & recs implemented - f/u serolgoies

## 2023-02-27 NOTE — ED PROVIDER NOTE - PSH
I have personally seen and examined the patient. I have collaborated with and supervised the
No significant past surgical history

## 2024-05-31 NOTE — ED PROVIDER NOTE - FAMILY HISTORY
No pertinent family history in first degree relatives No. NATA screening performed.  STOP BANG Legend: 0-2 = LOW Risk; 3-4 = INTERMEDIATE Risk; 5-8 = HIGH Risk
